# Patient Record
Sex: FEMALE | NOT HISPANIC OR LATINO | URBAN - METROPOLITAN AREA
[De-identification: names, ages, dates, MRNs, and addresses within clinical notes are randomized per-mention and may not be internally consistent; named-entity substitution may affect disease eponyms.]

---

## 2017-08-10 ENCOUNTER — INPATIENT (INPATIENT)
Facility: HOSPITAL | Age: 82
LOS: 7 days | Discharge: HOME | End: 2017-08-18
Attending: INTERNAL MEDICINE | Admitting: INTERNAL MEDICINE
Payer: MEDICAID

## 2017-08-10 VITALS
SYSTOLIC BLOOD PRESSURE: 140 MMHG | DIASTOLIC BLOOD PRESSURE: 73 MMHG | TEMPERATURE: 98 F | OXYGEN SATURATION: 95 % | HEART RATE: 64 BPM | RESPIRATION RATE: 15 BRPM

## 2017-08-10 DIAGNOSIS — R00.1 BRADYCARDIA, UNSPECIFIED: ICD-10-CM

## 2017-08-10 DIAGNOSIS — Z29.9 ENCOUNTER FOR PROPHYLACTIC MEASURES, UNSPECIFIED: ICD-10-CM

## 2017-08-10 DIAGNOSIS — I10 ESSENTIAL (PRIMARY) HYPERTENSION: ICD-10-CM

## 2017-08-10 DIAGNOSIS — E78.00 PURE HYPERCHOLESTEROLEMIA, UNSPECIFIED: ICD-10-CM

## 2017-08-10 DIAGNOSIS — Z98.890 OTHER SPECIFIED POSTPROCEDURAL STATES: Chronic | ICD-10-CM

## 2017-08-10 LAB
ALBUMIN SERPL ELPH-MCNC: 3.8 G/DL — SIGNIFICANT CHANGE UP (ref 3.3–5)
ALP SERPL-CCNC: 74 U/L — SIGNIFICANT CHANGE UP (ref 40–120)
ALT FLD-CCNC: 31 U/L — SIGNIFICANT CHANGE UP (ref 4–33)
APTT BLD: 27 SEC — LOW (ref 27.5–37.4)
AST SERPL-CCNC: 32 U/L — SIGNIFICANT CHANGE UP (ref 4–32)
BASOPHILS # BLD AUTO: 0.03 K/UL — SIGNIFICANT CHANGE UP (ref 0–0.2)
BASOPHILS NFR BLD AUTO: 0.6 % — SIGNIFICANT CHANGE UP (ref 0–2)
BILIRUB SERPL-MCNC: 0.5 MG/DL — SIGNIFICANT CHANGE UP (ref 0.2–1.2)
BUN SERPL-MCNC: 17 MG/DL — SIGNIFICANT CHANGE UP (ref 7–23)
CALCIUM SERPL-MCNC: 9.2 MG/DL — SIGNIFICANT CHANGE UP (ref 8.4–10.5)
CHLORIDE SERPL-SCNC: 106 MMOL/L — SIGNIFICANT CHANGE UP (ref 98–107)
CO2 SERPL-SCNC: 26 MMOL/L — SIGNIFICANT CHANGE UP (ref 22–31)
CREAT SERPL-MCNC: 1.02 MG/DL — SIGNIFICANT CHANGE UP (ref 0.5–1.3)
EOSINOPHIL # BLD AUTO: 0.52 K/UL — HIGH (ref 0–0.5)
EOSINOPHIL NFR BLD AUTO: 11.1 % — HIGH (ref 0–6)
GLUCOSE SERPL-MCNC: 87 MG/DL — SIGNIFICANT CHANGE UP (ref 70–99)
HCT VFR BLD CALC: 37.1 % — SIGNIFICANT CHANGE UP (ref 34.5–45)
HGB BLD-MCNC: 12 G/DL — SIGNIFICANT CHANGE UP (ref 11.5–15.5)
IMM GRANULOCYTES # BLD AUTO: 0.01 # — SIGNIFICANT CHANGE UP
IMM GRANULOCYTES NFR BLD AUTO: 0.2 % — SIGNIFICANT CHANGE UP (ref 0–1.5)
INR BLD: 1.03 — SIGNIFICANT CHANGE UP (ref 0.88–1.17)
LYMPHOCYTES # BLD AUTO: 1.55 K/UL — SIGNIFICANT CHANGE UP (ref 1–3.3)
LYMPHOCYTES # BLD AUTO: 33.2 % — SIGNIFICANT CHANGE UP (ref 13–44)
MCHC RBC-ENTMCNC: 27.1 PG — SIGNIFICANT CHANGE UP (ref 27–34)
MCHC RBC-ENTMCNC: 32.3 % — SIGNIFICANT CHANGE UP (ref 32–36)
MCV RBC AUTO: 83.9 FL — SIGNIFICANT CHANGE UP (ref 80–100)
MONOCYTES # BLD AUTO: 0.36 K/UL — SIGNIFICANT CHANGE UP (ref 0–0.9)
MONOCYTES NFR BLD AUTO: 7.7 % — SIGNIFICANT CHANGE UP (ref 2–14)
NEUTROPHILS # BLD AUTO: 2.2 K/UL — SIGNIFICANT CHANGE UP (ref 1.8–7.4)
NEUTROPHILS NFR BLD AUTO: 47.2 % — SIGNIFICANT CHANGE UP (ref 43–77)
NRBC # FLD: 0 — SIGNIFICANT CHANGE UP
PLATELET # BLD AUTO: 132 K/UL — LOW (ref 150–400)
PMV BLD: 11.7 FL — SIGNIFICANT CHANGE UP (ref 7–13)
POTASSIUM SERPL-MCNC: 4.4 MMOL/L — SIGNIFICANT CHANGE UP (ref 3.5–5.3)
POTASSIUM SERPL-SCNC: 4.4 MMOL/L — SIGNIFICANT CHANGE UP (ref 3.5–5.3)
PROT SERPL-MCNC: 7.2 G/DL — SIGNIFICANT CHANGE UP (ref 6–8.3)
PROTHROM AB SERPL-ACNC: 11.5 SEC — SIGNIFICANT CHANGE UP (ref 9.8–13.1)
RBC # BLD: 4.42 M/UL — SIGNIFICANT CHANGE UP (ref 3.8–5.2)
RBC # FLD: 12.9 % — SIGNIFICANT CHANGE UP (ref 10.3–14.5)
SODIUM SERPL-SCNC: 144 MMOL/L — SIGNIFICANT CHANGE UP (ref 135–145)
TROPONIN T SERPL-MCNC: < 0.06 NG/ML — SIGNIFICANT CHANGE UP (ref 0–0.06)
WBC # BLD: 4.67 K/UL — SIGNIFICANT CHANGE UP (ref 3.8–10.5)
WBC # FLD AUTO: 4.67 K/UL — SIGNIFICANT CHANGE UP (ref 3.8–10.5)

## 2017-08-10 PROCEDURE — 71010: CPT | Mod: 26

## 2017-08-10 RX ORDER — ATORVASTATIN CALCIUM 80 MG/1
80 TABLET, FILM COATED ORAL AT BEDTIME
Qty: 0 | Refills: 0 | Status: DISCONTINUED | OUTPATIENT
Start: 2017-08-10 | End: 2017-08-18

## 2017-08-10 RX ORDER — METOPROLOL TARTRATE 50 MG
50 TABLET ORAL DAILY
Qty: 0 | Refills: 0 | Status: DISCONTINUED | OUTPATIENT
Start: 2017-08-10 | End: 2017-08-18

## 2017-08-10 RX ORDER — AMLODIPINE BESYLATE 2.5 MG/1
5 TABLET ORAL DAILY
Qty: 0 | Refills: 0 | Status: DISCONTINUED | OUTPATIENT
Start: 2017-08-10 | End: 2017-08-18

## 2017-08-10 RX ORDER — LISINOPRIL 2.5 MG/1
40 TABLET ORAL DAILY
Qty: 0 | Refills: 0 | Status: DISCONTINUED | OUTPATIENT
Start: 2017-08-10 | End: 2017-08-18

## 2017-08-10 RX ORDER — RAMIPRIL 5 MG
2 CAPSULE ORAL
Qty: 0 | Refills: 0 | COMMUNITY

## 2017-08-10 RX ORDER — HEPARIN SODIUM 5000 [USP'U]/ML
5000 INJECTION INTRAVENOUS; SUBCUTANEOUS EVERY 8 HOURS
Qty: 0 | Refills: 0 | Status: DISCONTINUED | OUTPATIENT
Start: 2017-08-10 | End: 2017-08-13

## 2017-08-10 RX ORDER — SODIUM CHLORIDE 9 MG/ML
3 INJECTION INTRAMUSCULAR; INTRAVENOUS; SUBCUTANEOUS EVERY 8 HOURS
Qty: 0 | Refills: 0 | Status: DISCONTINUED | OUTPATIENT
Start: 2017-08-10 | End: 2017-08-18

## 2017-08-10 RX ORDER — METOPROLOL TARTRATE 50 MG
1 TABLET ORAL
Qty: 0 | Refills: 0 | COMMUNITY

## 2017-08-10 RX ORDER — ATORVASTATIN CALCIUM 80 MG/1
1 TABLET, FILM COATED ORAL
Qty: 0 | Refills: 0 | COMMUNITY

## 2017-08-10 RX ADMIN — SODIUM CHLORIDE 3 MILLILITER(S): 9 INJECTION INTRAMUSCULAR; INTRAVENOUS; SUBCUTANEOUS at 23:36

## 2017-08-10 NOTE — ED PROVIDER NOTE - CARE PLAN
Principal Discharge DX:	Bradycardia  Goal:	work up Principal Discharge DX:	Bradycardia  Goal:	work up.

## 2017-08-10 NOTE — ED PROVIDER NOTE - ATTENDING CONTRIBUTION TO CARE
patient sent in by cardiologist for bradycardia.  patient asymptomatic now. has holter monitor.  cv rrr.  admit to cards.

## 2017-08-10 NOTE — H&P ADULT - HISTORY OF PRESENT ILLNESS
84F, ambulates with a cane and has a history of WPW ablation at [place] 2015, NICM, EF 47% in April 2017, Last NST 5/2017 with No ischemia, who has been having Dyspnea, and was placed Holter monitor 8/9/17 for bradycardia. The monitor recorded 32 beats NSVT today and <1 min of Afib. EMS was called and the pt was taken to the Ed.     In the Ed, the EKG shows sinus sage with a 1st * AVB.  The pt was seen by the Cardio team. Their consult and recommendations are in the chart.   The pt currently denies chest pain, palpitations SOB, nausea, vomit, dizziness, falls.

## 2017-08-10 NOTE — CHART NOTE - NSCHARTNOTEFT_GEN_A_CORE
84 year old female with PMH WPW ablation at [place] 2015, NICM, EF 47% in April 2017, Last NST 5/2017 with No ischemia, who has been having Dyspnea, and was placed on an event monitor through my office.  THe monitor recorded 32 beats NSVT today and <1 min of Afib.  --Admit to tele  --Trend CE  --NPO after midnight for CArdiac Cath in AM  --If Afib persists >2 min, start heparin Gtt    Mary Beth Reynoso PA-C  [facility]  [address], [address]   [place] [address]  office [phone]  pager [phone]

## 2017-08-10 NOTE — ED PROVIDER NOTE - MEDICAL DECISION MAKING DETAILS
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

## 2017-08-10 NOTE — ED ADULT TRIAGE NOTE - CHIEF COMPLAINT QUOTE
Patient brought in by EMS from home. Patient on holter monitor and EMS was called by cardiologist who saw an abnormal rhythm (possibly bradycardia). Patient denies any symptoms. Placed holter monitor yesterday for bradycardia. PMH- htn, hld

## 2017-08-10 NOTE — ED ADULT NURSE NOTE - OBJECTIVE STATEMENT
Pt. received in stable condition and NAD AOx3. Sent for bradycardia from Holter monitor. Denies any symptoms at this time. 20G IV inserted to right AC. drawn and sent. MD evaluation in progress. Will cont. to monitor.

## 2017-08-10 NOTE — H&P ADULT - NSHPLABSRESULTS_GEN_ALL_CORE
CXR Slight left hemidiaphragm elevation. Focal indistinct bibasilar opacities indeterminate for subsegmental atelectatic change or scarring or possible developing airspace disease.  BUN/ Creatinine= 17/ 1.02  CE negative x 1  EKG SB @ 57b/ min, QT/ QTC = 402/ 449

## 2017-08-10 NOTE — PATIENT PROFILE ADULT. - ABILITY TO HEAR (WITH HEARING AID OR HEARING APPLIANCE IF NORMALLY USED):
Mildly to Moderately Impaired: difficulty hearing in some environments or speaker may need to increase volume or speak distinctly/left [place]

## 2017-08-10 NOTE — H&P ADULT - NEGATIVE ENMT SYMPTOMS
no abnormal taste sensation/no nose bleeds/no sinus symptoms/no gum bleeding/no ear pain/no tinnitus/no vertigo

## 2017-08-10 NOTE — H&P ADULT - NEGATIVE OPHTHALMOLOGIC SYMPTOMS
no photophobia/no discharge L/no blurred vision R/no lacrimation R/no lacrimation L/no blurred vision L/no discharge R

## 2017-08-10 NOTE — ED PROVIDER NOTE - OBJECTIVE STATEMENT
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 9/2015), NICM (EF 45%), had external cardiac monitor placed by outpt cardiology (George Millan) on 8/8, BIBEMS after device detected bradycardia to 37. At current time, pt denies CP, SOB, palpitations, abdomen pain, fevers, chills.     Traveled to [place] in April  Bradycardia to 37     PCP: Agustín?   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 At current time, pt denies CP, SOB, palpitations, abdomen pain, fevers, chills.     Traveled to [place] in April  Bradycardia to 37     PCP: Agustín?   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 At current time, pt denies CP, SOB, palpitations, abdomen pain, fevers, chills.     Traveled to [place] in April  Bradycardia to 37     PCP: Misbah  Cardio: Yana [place]  Daughter: Vicky [place]

## 2017-08-11 LAB
BUN SERPL-MCNC: 16 MG/DL — SIGNIFICANT CHANGE UP (ref 7–23)
CALCIUM SERPL-MCNC: 9.2 MG/DL — SIGNIFICANT CHANGE UP (ref 8.4–10.5)
CHLORIDE SERPL-SCNC: 106 MMOL/L — SIGNIFICANT CHANGE UP (ref 98–107)
CHOLEST SERPL-MCNC: 115 MG/DL — LOW (ref 120–199)
CK SERPL-CCNC: 101 U/L — SIGNIFICANT CHANGE UP (ref 25–170)
CO2 SERPL-SCNC: 27 MMOL/L — SIGNIFICANT CHANGE UP (ref 22–31)
CREAT SERPL-MCNC: 0.99 MG/DL — SIGNIFICANT CHANGE UP (ref 0.5–1.3)
GLUCOSE SERPL-MCNC: 93 MG/DL — SIGNIFICANT CHANGE UP (ref 70–99)
HCT VFR BLD CALC: 36.7 % — SIGNIFICANT CHANGE UP (ref 34.5–45)
HDLC SERPL-MCNC: 52 MG/DL — SIGNIFICANT CHANGE UP (ref 45–65)
HGB BLD-MCNC: 12 G/DL — SIGNIFICANT CHANGE UP (ref 11.5–15.5)
LIPID PNL WITH DIRECT LDL SERPL: 62 MG/DL — SIGNIFICANT CHANGE UP
MAGNESIUM SERPL-MCNC: 1.9 MG/DL — SIGNIFICANT CHANGE UP (ref 1.6–2.6)
MCHC RBC-ENTMCNC: 27.5 PG — SIGNIFICANT CHANGE UP (ref 27–34)
MCHC RBC-ENTMCNC: 32.7 % — SIGNIFICANT CHANGE UP (ref 32–36)
MCV RBC AUTO: 84.2 FL — SIGNIFICANT CHANGE UP (ref 80–100)
NRBC # FLD: 0 — SIGNIFICANT CHANGE UP
PHOSPHATE SERPL-MCNC: 3.7 MG/DL — SIGNIFICANT CHANGE UP (ref 2.5–4.5)
PLATELET # BLD AUTO: 129 K/UL — LOW (ref 150–400)
PMV BLD: 12.4 FL — SIGNIFICANT CHANGE UP (ref 7–13)
POTASSIUM SERPL-MCNC: 4.2 MMOL/L — SIGNIFICANT CHANGE UP (ref 3.5–5.3)
POTASSIUM SERPL-SCNC: 4.2 MMOL/L — SIGNIFICANT CHANGE UP (ref 3.5–5.3)
RBC # BLD: 4.36 M/UL — SIGNIFICANT CHANGE UP (ref 3.8–5.2)
RBC # FLD: 12.9 % — SIGNIFICANT CHANGE UP (ref 10.3–14.5)
SODIUM SERPL-SCNC: 143 MMOL/L — SIGNIFICANT CHANGE UP (ref 135–145)
T3 SERPL-MCNC: 112 NG/DL — SIGNIFICANT CHANGE UP (ref 80–200)
T4 AB SER-ACNC: 7.03 UG/DL — SIGNIFICANT CHANGE UP (ref 5.1–13)
TRIGL SERPL-MCNC: 40 MG/DL — SIGNIFICANT CHANGE UP (ref 10–149)
TROPONIN T SERPL-MCNC: < 0.06 NG/ML — SIGNIFICANT CHANGE UP (ref 0–0.06)
TSH SERPL-MCNC: 0.92 UIU/ML — SIGNIFICANT CHANGE UP (ref 0.27–4.2)
WBC # BLD: 4.16 K/UL — SIGNIFICANT CHANGE UP (ref 3.8–10.5)
WBC # FLD AUTO: 4.16 K/UL — SIGNIFICANT CHANGE UP (ref 3.8–10.5)

## 2017-08-11 PROCEDURE — 71270 CT THORAX DX C-/C+: CPT | Mod: 26

## 2017-08-11 PROCEDURE — 74178 CT ABD&PLV WO CNTR FLWD CNTR: CPT | Mod: 26

## 2017-08-11 PROCEDURE — 76775 US EXAM ABDO BACK WALL LIM: CPT | Mod: 26

## 2017-08-11 RX ORDER — MAGNESIUM OXIDE 400 MG ORAL TABLET 241.3 MG
400 TABLET ORAL
Qty: 0 | Refills: 0 | Status: COMPLETED | OUTPATIENT
Start: 2017-08-11 | End: 2017-08-13

## 2017-08-11 RX ADMIN — Medication 50 MILLIGRAM(S): at 05:37

## 2017-08-11 RX ADMIN — SODIUM CHLORIDE 3 MILLILITER(S): 9 INJECTION INTRAMUSCULAR; INTRAVENOUS; SUBCUTANEOUS at 05:36

## 2017-08-11 RX ADMIN — ATORVASTATIN CALCIUM 80 MILLIGRAM(S): 80 TABLET, FILM COATED ORAL at 22:07

## 2017-08-11 RX ADMIN — SODIUM CHLORIDE 3 MILLILITER(S): 9 INJECTION INTRAMUSCULAR; INTRAVENOUS; SUBCUTANEOUS at 22:06

## 2017-08-11 RX ADMIN — HEPARIN SODIUM 5000 UNIT(S): 5000 INJECTION INTRAVENOUS; SUBCUTANEOUS at 12:31

## 2017-08-11 RX ADMIN — HEPARIN SODIUM 5000 UNIT(S): 5000 INJECTION INTRAVENOUS; SUBCUTANEOUS at 05:37

## 2017-08-11 RX ADMIN — LISINOPRIL 40 MILLIGRAM(S): 2.5 TABLET ORAL at 05:37

## 2017-08-11 RX ADMIN — SODIUM CHLORIDE 3 MILLILITER(S): 9 INJECTION INTRAMUSCULAR; INTRAVENOUS; SUBCUTANEOUS at 12:05

## 2017-08-11 RX ADMIN — MAGNESIUM OXIDE 400 MG ORAL TABLET 400 MILLIGRAM(S): 241.3 TABLET ORAL at 17:35

## 2017-08-11 RX ADMIN — ATORVASTATIN CALCIUM 80 MILLIGRAM(S): 80 TABLET, FILM COATED ORAL at 00:04

## 2017-08-11 RX ADMIN — AMLODIPINE BESYLATE 5 MILLIGRAM(S): 2.5 TABLET ORAL at 05:37

## 2017-08-11 RX ADMIN — Medication 1 TABLET(S): at 12:31

## 2017-08-11 RX ADMIN — HEPARIN SODIUM 5000 UNIT(S): 5000 INJECTION INTRAVENOUS; SUBCUTANEOUS at 22:07

## 2017-08-11 NOTE — PROGRESS NOTE ADULT - SUBJECTIVE AND OBJECTIVE BOX
SUBJECTIVE:  No CP, SOB      MEDICATIONS  (STANDING):  sodium chloride 0.9% lock flush 3 milliLiter(s) IV Push every 8 hours  lisinopril 40 milliGRAM(s) Oral daily  atorvastatin 80 milliGRAM(s) Oral at bedtime  metoprolol succinate ER 50 milliGRAM(s) Oral daily  amLODIPine   Tablet 5 milliGRAM(s) Oral daily  multivitamin 1 Tablet(s) Oral daily  heparin  Injectable 5000 Unit(s) SubCutaneous every 8 hours    LABS:                        12.0   4.16  )-----------( 129      ( 11 Aug 2017 06:30 )             36.7     143  |  106  |  16  ----------------------------<  93  4.2   |  27  |  0.99    Ca    9.2      11 Aug 2017 06:30  Phos  3.7     08-11  Mg     1.9     08-11    TPro  7.2  /  Alb  3.8  /  TBili  0.5  /  DBili  x   /  AST  32  /  ALT  31  /  AlkPhos  74  08-10    CARDIAC MARKERS ( 11 Aug 2017 00:40 )  x     / < 0.06 ng/mL / 101 u/L / x     / x      CARDIAC MARKERS ( 10 Aug 2017 12:30 )  x     / < 0.06 ng/mL / x     / x     / x        PHYSICAL EXAM:  Vital Signs Last 24 Hrs  T(C): 36.6 (11 Aug 2017 05:35), Max: 36.8 (10 Aug 2017 18:54)  T(F): 97.9 (11 Aug 2017 05:35), Max: 98.3 (10 Aug 2017 18:54)  HR: 60 (11 Aug 2017 05:35) (47 - 64)  BP: 124/72 (11 Aug 2017 05:35) (109/56 - 140/73)  RR: 18 (11 Aug 2017 05:35) (15 - 19)  SpO2: 99% (11 Aug 2017 05:35) (95% - 100%)    HEENT: Normal Oral mucosa, PERRL, EOMI  Lymphatic: No obvious lymphadenopathy, No edema  Cardiovascular: Normal S1S2, No JVD, 1/6 MARK, Peripheral pulses palpable 2+ B/L  Respiratory: Lungs clear to auscultation, normal effort  Gastrointestinal: Abdomen soft, ND, NT, +BS  Skin: Warm, dry, intact. No cyanosis, No rash.  Musculoskeletal: Normal ROM, normal strength  Psychiatric: Appropriate Mood and Affect      DIAGNOSTIC DATA  TELEMETRY: SB/SR      ASSESSMENT AND PLAN:  84 year old female with PMH WPW ablation at [place] 2015, NIC, EF 47% in April 2017, Last NST 5/2017 with No ischemia, who has been having Dyspnea, and was placed on an event monitor through my office.  THe monitor recorded 32 beats NSVT today and <1 min of Afib.  --D/W Dr [place].  patient had an abnormal Abdominal sono in our office.   --Check CTA CAP today and Abd sono here to eval for AAA, R/O dissection  --PLan for cardiac cath pending those studies  --Continue telemetry  --Repeat TTE with definity  --If Afib persists >2 min, start heparin Gtt    Mary Beth Reynoso PA-C  [facility]  [address], [address]   [place] [address]  office [phone]  pager [phone].

## 2017-08-12 DIAGNOSIS — R93.5 ABNORMAL FINDINGS ON DIAGNOSTIC IMAGING OF OTHER ABDOMINAL REGIONS, INCLUDING RETROPERITONEUM: ICD-10-CM

## 2017-08-12 LAB
BUN SERPL-MCNC: 20 MG/DL — SIGNIFICANT CHANGE UP (ref 7–23)
CALCIUM SERPL-MCNC: 9.5 MG/DL — SIGNIFICANT CHANGE UP (ref 8.4–10.5)
CHLORIDE SERPL-SCNC: 103 MMOL/L — SIGNIFICANT CHANGE UP (ref 98–107)
CO2 SERPL-SCNC: 26 MMOL/L — SIGNIFICANT CHANGE UP (ref 22–31)
CREAT SERPL-MCNC: 1.07 MG/DL — SIGNIFICANT CHANGE UP (ref 0.5–1.3)
GLUCOSE SERPL-MCNC: 95 MG/DL — SIGNIFICANT CHANGE UP (ref 70–99)
HCT VFR BLD CALC: 37.2 % — SIGNIFICANT CHANGE UP (ref 34.5–45)
HGB BLD-MCNC: 12.1 G/DL — SIGNIFICANT CHANGE UP (ref 11.5–15.5)
MAGNESIUM SERPL-MCNC: 1.9 MG/DL — SIGNIFICANT CHANGE UP (ref 1.6–2.6)
MCHC RBC-ENTMCNC: 26.8 PG — LOW (ref 27–34)
MCHC RBC-ENTMCNC: 32.5 % — SIGNIFICANT CHANGE UP (ref 32–36)
MCV RBC AUTO: 82.3 FL — SIGNIFICANT CHANGE UP (ref 80–100)
NRBC # FLD: 0 — SIGNIFICANT CHANGE UP
PHOSPHATE SERPL-MCNC: 3.7 MG/DL — SIGNIFICANT CHANGE UP (ref 2.5–4.5)
PLATELET # BLD AUTO: 133 K/UL — LOW (ref 150–400)
PMV BLD: 11.6 FL — SIGNIFICANT CHANGE UP (ref 7–13)
POTASSIUM SERPL-MCNC: 4.3 MMOL/L — SIGNIFICANT CHANGE UP (ref 3.5–5.3)
POTASSIUM SERPL-SCNC: 4.3 MMOL/L — SIGNIFICANT CHANGE UP (ref 3.5–5.3)
RBC # BLD: 4.52 M/UL — SIGNIFICANT CHANGE UP (ref 3.8–5.2)
RBC # FLD: 12.8 % — SIGNIFICANT CHANGE UP (ref 10.3–14.5)
SODIUM SERPL-SCNC: 140 MMOL/L — SIGNIFICANT CHANGE UP (ref 135–145)
WBC # BLD: 4.54 K/UL — SIGNIFICANT CHANGE UP (ref 3.8–10.5)
WBC # FLD AUTO: 4.54 K/UL — SIGNIFICANT CHANGE UP (ref 3.8–10.5)

## 2017-08-12 RX ADMIN — ATORVASTATIN CALCIUM 80 MILLIGRAM(S): 80 TABLET, FILM COATED ORAL at 22:22

## 2017-08-12 RX ADMIN — SODIUM CHLORIDE 3 MILLILITER(S): 9 INJECTION INTRAMUSCULAR; INTRAVENOUS; SUBCUTANEOUS at 05:34

## 2017-08-12 RX ADMIN — MAGNESIUM OXIDE 400 MG ORAL TABLET 400 MILLIGRAM(S): 241.3 TABLET ORAL at 17:34

## 2017-08-12 RX ADMIN — Medication 1 TABLET(S): at 11:54

## 2017-08-12 RX ADMIN — HEPARIN SODIUM 5000 UNIT(S): 5000 INJECTION INTRAVENOUS; SUBCUTANEOUS at 14:20

## 2017-08-12 RX ADMIN — SODIUM CHLORIDE 3 MILLILITER(S): 9 INJECTION INTRAMUSCULAR; INTRAVENOUS; SUBCUTANEOUS at 22:22

## 2017-08-12 RX ADMIN — MAGNESIUM OXIDE 400 MG ORAL TABLET 400 MILLIGRAM(S): 241.3 TABLET ORAL at 11:54

## 2017-08-12 RX ADMIN — HEPARIN SODIUM 5000 UNIT(S): 5000 INJECTION INTRAVENOUS; SUBCUTANEOUS at 22:22

## 2017-08-12 RX ADMIN — LISINOPRIL 40 MILLIGRAM(S): 2.5 TABLET ORAL at 05:35

## 2017-08-12 RX ADMIN — HEPARIN SODIUM 5000 UNIT(S): 5000 INJECTION INTRAVENOUS; SUBCUTANEOUS at 05:35

## 2017-08-12 RX ADMIN — SODIUM CHLORIDE 3 MILLILITER(S): 9 INJECTION INTRAMUSCULAR; INTRAVENOUS; SUBCUTANEOUS at 11:50

## 2017-08-12 RX ADMIN — AMLODIPINE BESYLATE 5 MILLIGRAM(S): 2.5 TABLET ORAL at 05:35

## 2017-08-12 NOTE — PROGRESS NOTE ADULT - SUBJECTIVE AND OBJECTIVE BOX
SUBJECTIVE:  No CP, SOB      MEDICATIONS  (STANDING):  sodium chloride 0.9% lock flush 3 milliLiter(s) IV Push every 8 hours  lisinopril 40 milliGRAM(s) Oral daily  atorvastatin 80 milliGRAM(s) Oral at bedtime  metoprolol succinate ER 50 milliGRAM(s) Oral daily  amLODIPine   Tablet 5 milliGRAM(s) Oral daily  multivitamin 1 Tablet(s) Oral daily  heparin  Injectable 5000 Unit(s) SubCutaneous every 8 hours  magnesium oxide 400 milliGRAM(s) Oral two times a day with meals    MEDICATIONS  (PRN):      LABS:                        12.1   4.54  )-----------( 133      ( 12 Aug 2017 06:30 )             37.2     Hemoglobin: 12.1 g/dL (08-12 @ 06:30)  Hemoglobin: 12.0 g/dL (08-11 @ 06:30)  Hemoglobin: 12.0 g/dL (08-10 @ 12:30)    08-12    140  |  103  |  20  ----------------------------<  95  4.3   |  26  |  1.07    Ca    9.5      12 Aug 2017 06:30  Phos  3.7     08-12  Mg     1.9     08-12    TPro  7.2  /  Alb  3.8  /  TBili  0.5  /  DBili  x   /  AST  32  /  ALT  31  /  AlkPhos  74  08-10    Creatinine Trend: 1.07<--, 0.99<--, 1.02<--     CARDIAC MARKERS ( 11 Aug 2017 00:40 )  x     / < 0.06 ng/mL / 101 u/L / x     / x      CARDIAC MARKERS ( 10 Aug 2017 12:30 )  x     / < 0.06 ng/mL / x     / x     / x          PHYSICAL EXAM  Vital Signs Last 24 Hrs  T(C): 36.6 (12 Aug 2017 05:26), Max: 36.6 (12 Aug 2017 05:26)  T(F): 97.9 (12 Aug 2017 05:26), Max: 97.9 (12 Aug 2017 05:26)  HR: 56 (12 Aug 2017 05:26) (56 - 62)  BP: 103/63 (12 Aug 2017 05:26) (103/63 - 144/71)  RR: 17 (11 Aug 2017 22:04) (16 - 17)  SpO2: 100% (12 Aug 2017 05:26) (100% - 100%)    HEENT: Normal Oral mucosa, PERRL, EOMI  Lymphatic: No obvious lymphadenopathy, No edema  Cardiovascular: Normal S1S2, No JVD, 1/6 MARK, Peripheral pulses palpable 2+ B/L  Respiratory: Lungs clear to auscultation, normal effort  Gastrointestinal: Abdomen soft, ND, NT, +BS  Skin: Warm, dry, intact. No cyanosis, No rash.  Musculoskeletal: Normal ROM, normal strength  Psychiatric: Appropriate Mood and Affect      DIAGNOSTIC DATA  TELEMETRY: SB/SR    < from: US Abdominal Aorta (08.11.17 @ 11:00) >  IMPRESSION:     No evidence of abdominal aortic aneurysm. Correlated with pending CT.      ADILIA ALATORRE M.D., RADIOLOGYRESIDENT  This document has been electronically signed.  CHINTAN JARVIS M.D., ATTENDING RADIOLOGIST  This document has been electronically signed. Aug 11 2017 12:27PM    < end of copied text >    < from: CT Angio Abdomen and Pelvis w/ IV Cont (08.11.17 @ 15:34) >  IMPRESSION:   No evidence of aortic dissection.  No evidence of AAA. Tortuous descending abdominal aorta.  Dilatation of the pancreatic and common bile duct. Further evaluation   with endoscopy and/or MRCP is suggested.    BESS BERTRAND M.D., ATTENDING RADIOLOGIST  This document has been electronically signed. Aug 11 2017  4:40PM    < end of copied text >      ASSESSMENT AND PLAN:  84 year old female with PMH WPW ablation at [place] 2015, NICM, EF 47% in April 2017, Last NST 5/2017 with No ischemia, who has been having Dyspnea, and was placed on an event monitor through my office.  THe monitor recorded 32 beats NSVT today and <1 min of Afib.  --Per Dr Vaz[place], patient had an abnormal Abdominal sono in our office so a repeat was done (as above) and a CTA was done  --GI eval called regarding CTA findings  --Repeat TTE with definity pending  --No further evidence of AF on tele since admit  --if AFib persisted >2 min then start Heparin gtt  --PLan for cardiac cath next week  --check daily labs and keep NPO after MN on 8/13  --Continue telemetry      Mary Beth Reynoso PA-C  [facility]  [address], [address]   [place] [address]  office [phone]  pager [phone].

## 2017-08-12 NOTE — CONSULT NOTE ADULT - SUBJECTIVE AND OBJECTIVE BOX
Chief Complaint:  Patient is a 84y old  Female who presents with a chief complaint of Bradycardia   84F, ambulates with a cane and has a history of WPW ablation at [place] 2015, [facility], EF 47% in 2017, Last NST 2017 with No ischemia, who has been having Dyspnea, and was placed Holter monitor 17 for bradycardia. The monitor recorded 32 beats NSVT today and <1 min of Afib. EMS was called and the pt was taken to the Ed.   no weight loss apetite is good never had imaging before no jaundice or icterus  In the Ed, the EKG shows sinus sage with a 1st * AVB.  The pt was seen by the Cardio team. Their consult and recommendations are in the chart.   The pt currently denies chest pain, palpitations SOB, nausea, vomit, dizziness, falls.         HPI:    Allergies:  No Known Allergies      Medications:  sodium chloride 0.9% lock flush 3 milliLiter(s) IV Push every 8 hours  lisinopril 40 milliGRAM(s) Oral daily  atorvastatin 80 milliGRAM(s) Oral at bedtime  metoprolol succinate ER 50 milliGRAM(s) Oral daily  amLODIPine   Tablet 5 milliGRAM(s) Oral daily  multivitamin 1 Tablet(s) Oral daily  heparin  Injectable 5000 Unit(s) SubCutaneous every 8 hours  magnesium oxide 400 milliGRAM(s) Oral two times a day with meals      PMHX/PSHX:  Hypercholesteremia  Essential hypertension  H/O prior ablation treatment  No significant past surgical history      Family history:  No pertinent family history in first degree relatives      Social History:  and lives with spouse   Denies Nicotine   Denies ETOH    ROS:     General:  No wt loss, fevers, chills, night sweats, fatigue,   Eyes:  Good vision, no reported pain  ENT:  No sore throat, pain, runny nose, dysphagia  CV:  No pain, palpitations, hypo/hypertension  Resp:  No dyspnea, cough, tachypnea, wheezing  GI:  No pain, No nausea, No vomiting, No diarrhea, No constipation, No weight loss, No fever, No pruritis, No rectal bleeding, No tarry stools, No dysphagia,  :  No pain, bleeding, incontinence, nocturia  Muscle:  No pain, weakness  Neuro:  No weakness, tingling, memory problems  Psych:  No fatigue, insomnia, mood problems, depression  Endocrine:  No polyuria, polydipsia, cold/heat intolerance  Heme:  No petechiae, ecchymosis, easy bruisability  Skin:  No rash, tattoos, scars, edema      PHYSICAL EXAM:   Vital Signs:  Vital Signs Last 24 Hrs  T(C): 36.1 (12 Aug 2017 13:35), Max: 36.6 (12 Aug 2017 05:26)  T(F): 97 (12 Aug 2017 13:35), Max: 97.9 (12 Aug 2017 05:26)  HR: 70 (12 Aug 2017 15:04) (56 - 72)  BP: 125/60 (12 Aug 2017 15:04) (103/63 - 144/71)  BP(mean): --  RR: 18 (12 Aug 2017 13:35) (17 - 18)  SpO2: 100% (12 Aug 2017 13:) (100% - 100%)  Daily     Daily Weight in k.4 (12 Aug 2017 05:26)    GENERAL:  Appears stated age, well-groomed, well-nourished, no distress  HEENT:  NC/AT,  conjunctivae clear and pink, no thyromegaly, nodules, adenopathy, no JVD, sclera -anicteric  CHEST:  Full & symmetric excursion, no increased effort, breath sounds clear  HEART:  Regular rhythm, S1, S2, no murmur/rub/S3/S4, no abdominal bruit, no edema  ABDOMEN:  Soft, non-tender, non-distended, normoactive bowel sounds,  no masses ,no hepato-splenomegaly, no signs of chronic liver disease  EXTEREMITIES:  no cyanosis,clubbing or edema  SKIN:  No rash/erythema/ecchymoses/petechiae/wounds/abscess/warm/dry  NEURO:  Alert, oriented, no asterixis, no tremor, no encephalopathy    LABS:                        12.1   4.54  )-----------( 133      ( 12 Aug 2017 06:30 )             37.2     08-12    140  |  103  |  20  ----------------------------<  95  4.3   |  26  |  1.07    Ca    9.5      12 Aug 2017 06:30  Phos  3.7     08-12  Mg     1.9     08-12                Imaging:

## 2017-08-12 NOTE — CONSULT NOTE ADULT - ASSESSMENT
84F with mild TCP and slight dilatation of the pancreatic duct without any mass, no splenomegaly, going for cath on monday. Will recommend:  - obtain B12, folate, RF, FRIEDA  - hepatitis profile, HIV 1/2  - currently, the plt count is acceptable for cath  - obtain GI evaluation for MRCP and or EUS  - obtain CEA, CA 19-9  - will f/u  - message sent for DR. Escalera and spoke to PA  - rest of the Rx as per medicine and cardiology

## 2017-08-12 NOTE — CONSULT NOTE ADULT - SUBJECTIVE AND OBJECTIVE BOX
Patient is a 84y old  Female who presents with a chief complaint of Bradycardia (10 Aug 2017 23:30), sent here from cardiology office, feels better but is lightheaded, denies any pain, blurry or double vision and rest of the detailed ROS unremarkable. Does not remember if she ever had a mammogram, colonoscopy etc. No h/o any blood transfusion or jaundice.      PAST MEDICAL & SURGICAL HISTORY:  Hypercholesteremia  Essential hypertension  H/O prior ablation treatment    Meds:  sodium chloride 0.9% lock flush 3 milliLiter(s) IV Push every 8 hours  lisinopril 40 milliGRAM(s) Oral daily  atorvastatin 80 milliGRAM(s) Oral at bedtime  metoprolol succinate ER 50 milliGRAM(s) Oral daily  amLODIPine   Tablet 5 milliGRAM(s) Oral daily  multivitamin 1 Tablet(s) Oral daily  heparin  Injectable 5000 Unit(s) SubCutaneous every 8 hours  magnesium oxide 400 milliGRAM(s) Oral two times a day with meals      No Known Allergies    SHx: never smoked, denies alcohol use    FAMILY HISTORY:  No pertinent family history in first degree relatives      Vital Signs Last 24 Hrs  T(C): 36.6 (12 Aug 2017 05:26), Max: 36.6 (12 Aug 2017 05:26)  T(F): 97.9 (12 Aug 2017 05:26), Max: 97.9 (12 Aug 2017 05:26)  HR: 56 (12 Aug 2017 05:26) (56 - 62)  BP: 103/63 (12 Aug 2017 05:26) (103/63 - 144/71)  BP(mean): --  RR: 17 (11 Aug 2017 22:04) (16 - 17)  SpO2: 100% (12 Aug 2017 05:26) (100% - 100%)                          12.1   4.54  )-----------( 133      ( 12 Aug 2017 06:30 )             37.2       08-12    140  |  103  |  20  ----------------------------<  95  4.3   |  26  |  1.07    Ca    9.5      12 Aug 2017 06:30  Phos  3.7     08-12  Mg     1.9     08-12    TPro  7.2  /  Alb  3.8  /  TBili  0.5  /  DBili  x   /  AST  32  /  ALT  31  /  AlkPhos  74  08-10      PT/INR - ( 10 Aug 2017 12:30 )   PT: 11.5 SEC;   INR: 1.03          PTT - ( 10 Aug 2017 12:30 )  PTT:27.0 SEC    CT angio chest/a/p: CHEST:     LUNGS AND LARGE AIRWAYS: Patent central airways. Bibasilar subsegmental   atelectasis. No pulmonary nodules.  PLEURA: No pleural effusion.  VESSELS: No evidence of thoracic aortic dissection. Atherosclerotic   calcifications of aorta and coronary arteries.  HEART: Heart size is normal. No pericardial effusion.  MEDIASTINUM AND JODIE: No lymphadenopathy.  CHEST WALL AND LOWER NECK: Within normal limits.    ABDOMEN AND PELVIS:    LIVER: Within normal limits.  BILE DUCTS: Normal caliber.  GALLBLADDER: Small amount of sludge or tiny noncalcified dependent stone.  SPLEEN: Within normal limits.  PANCREAS: Dilatation of pancreatic and common bile duct to about 8 mm. No   definite mass seen. ADRENALS: Within normal limits.  KIDNEYS/URETERS: 5.1 x 5.7 cm left renal cysts.  BLADDER: Within normal limits.  REPRODUCTIVE ORGANS: Within normal limits.    BOWEL: No bowel obstruction. Appendix      PERITONEUM: No ascites.  VESSELS:  Tortuous abdominal aorta. No evidence of AAA.  RETROPERITONEUM: No lymphadenopathy.    ABDOMINAL WALL: Within normal limits.  BONES: Degenerative disease of spine..    IMPRESSION:   No evidence of aortic dissection.  No evidence of AAA. Tortuous descending abdominal aorta.  Dilatation of the pancreatic and common bile duct. Further evaluation   with endoscopy and/or MRCP is suggested.

## 2017-08-13 LAB
APTT BLD: > 200 SEC — CRITICAL HIGH (ref 27.5–37.4)
BUN SERPL-MCNC: 19 MG/DL — SIGNIFICANT CHANGE UP (ref 7–23)
CALCIUM SERPL-MCNC: 9.5 MG/DL — SIGNIFICANT CHANGE UP (ref 8.4–10.5)
CANCER AG19-9 SERPL-ACNC: < 1 U/ML — SIGNIFICANT CHANGE UP
CEA SERPL-MCNC: 3.5 NG/ML — SIGNIFICANT CHANGE UP (ref 0.2–3.8)
CHLORIDE SERPL-SCNC: 103 MMOL/L — SIGNIFICANT CHANGE UP (ref 98–107)
CO2 SERPL-SCNC: 28 MMOL/L — SIGNIFICANT CHANGE UP (ref 22–31)
CREAT SERPL-MCNC: 1.08 MG/DL — SIGNIFICANT CHANGE UP (ref 0.5–1.3)
FOLATE SERPL-MCNC: 12 NG/ML — SIGNIFICANT CHANGE UP (ref 4.7–20)
GLUCOSE SERPL-MCNC: 96 MG/DL — SIGNIFICANT CHANGE UP (ref 70–99)
HAV IGM SER-ACNC: NONREACTIVE — SIGNIFICANT CHANGE UP
HBV CORE IGM SER-ACNC: NONREACTIVE — SIGNIFICANT CHANGE UP
HBV SURFACE AG SER-ACNC: NONREACTIVE — SIGNIFICANT CHANGE UP
HCT VFR BLD CALC: 37.9 % — SIGNIFICANT CHANGE UP (ref 34.5–45)
HCT VFR BLD CALC: 39.3 % — SIGNIFICANT CHANGE UP (ref 34.5–45)
HCV AB S/CO SERPL IA: 0.15 S/CO — SIGNIFICANT CHANGE UP
HCV AB SERPL-IMP: SIGNIFICANT CHANGE UP
HGB BLD-MCNC: 12.3 G/DL — SIGNIFICANT CHANGE UP (ref 11.5–15.5)
HGB BLD-MCNC: 12.8 G/DL — SIGNIFICANT CHANGE UP (ref 11.5–15.5)
HIV1 AG SER QL: SIGNIFICANT CHANGE UP
HIV1+2 AB SPEC QL: SIGNIFICANT CHANGE UP
MAGNESIUM SERPL-MCNC: 2.1 MG/DL — SIGNIFICANT CHANGE UP (ref 1.6–2.6)
MCHC RBC-ENTMCNC: 27.1 PG — SIGNIFICANT CHANGE UP (ref 27–34)
MCHC RBC-ENTMCNC: 27.1 PG — SIGNIFICANT CHANGE UP (ref 27–34)
MCHC RBC-ENTMCNC: 32.5 % — SIGNIFICANT CHANGE UP (ref 32–36)
MCHC RBC-ENTMCNC: 32.6 % — SIGNIFICANT CHANGE UP (ref 32–36)
MCV RBC AUTO: 83.3 FL — SIGNIFICANT CHANGE UP (ref 80–100)
MCV RBC AUTO: 83.5 FL — SIGNIFICANT CHANGE UP (ref 80–100)
NRBC # FLD: 0 — SIGNIFICANT CHANGE UP
NRBC # FLD: 0 — SIGNIFICANT CHANGE UP
PHOSPHATE SERPL-MCNC: 3.9 MG/DL — SIGNIFICANT CHANGE UP (ref 2.5–4.5)
PLATELET # BLD AUTO: 133 K/UL — LOW (ref 150–400)
PLATELET # BLD AUTO: 139 K/UL — LOW (ref 150–400)
PMV BLD: 11.7 FL — SIGNIFICANT CHANGE UP (ref 7–13)
PMV BLD: 12.5 FL — SIGNIFICANT CHANGE UP (ref 7–13)
POTASSIUM SERPL-MCNC: 4.6 MMOL/L — SIGNIFICANT CHANGE UP (ref 3.5–5.3)
POTASSIUM SERPL-SCNC: 4.6 MMOL/L — SIGNIFICANT CHANGE UP (ref 3.5–5.3)
RBC # BLD: 4.54 M/UL — SIGNIFICANT CHANGE UP (ref 3.8–5.2)
RBC # BLD: 4.72 M/UL — SIGNIFICANT CHANGE UP (ref 3.8–5.2)
RBC # FLD: 12.9 % — SIGNIFICANT CHANGE UP (ref 10.3–14.5)
RBC # FLD: 12.9 % — SIGNIFICANT CHANGE UP (ref 10.3–14.5)
RHEUMATOID FACT SERPL-ACNC: 9.9 IU/ML — SIGNIFICANT CHANGE UP
SODIUM SERPL-SCNC: 142 MMOL/L — SIGNIFICANT CHANGE UP (ref 135–145)
VIT B12 SERPL-MCNC: 450 PG/ML — SIGNIFICANT CHANGE UP (ref 200–900)
WBC # BLD: 4.82 K/UL — SIGNIFICANT CHANGE UP (ref 3.8–10.5)
WBC # BLD: 5.48 K/UL — SIGNIFICANT CHANGE UP (ref 3.8–10.5)
WBC # FLD AUTO: 4.82 K/UL — SIGNIFICANT CHANGE UP (ref 3.8–10.5)
WBC # FLD AUTO: 5.48 K/UL — SIGNIFICANT CHANGE UP (ref 3.8–10.5)

## 2017-08-13 RX ORDER — HEPARIN SODIUM 5000 [USP'U]/ML
INJECTION INTRAVENOUS; SUBCUTANEOUS
Qty: 25000 | Refills: 0 | Status: DISCONTINUED | OUTPATIENT
Start: 2017-08-13 | End: 2017-08-16

## 2017-08-13 RX ORDER — HEPARIN SODIUM 5000 [USP'U]/ML
6500 INJECTION INTRAVENOUS; SUBCUTANEOUS EVERY 6 HOURS
Qty: 0 | Refills: 0 | Status: DISCONTINUED | OUTPATIENT
Start: 2017-08-13 | End: 2017-08-16

## 2017-08-13 RX ORDER — HEPARIN SODIUM 5000 [USP'U]/ML
3000 INJECTION INTRAVENOUS; SUBCUTANEOUS EVERY 6 HOURS
Qty: 0 | Refills: 0 | Status: DISCONTINUED | OUTPATIENT
Start: 2017-08-13 | End: 2017-08-16

## 2017-08-13 RX ORDER — HEPARIN SODIUM 5000 [USP'U]/ML
6500 INJECTION INTRAVENOUS; SUBCUTANEOUS ONCE
Qty: 0 | Refills: 0 | Status: COMPLETED | OUTPATIENT
Start: 2017-08-13 | End: 2017-08-13

## 2017-08-13 RX ADMIN — HEPARIN SODIUM 1400 UNIT(S)/HR: 5000 INJECTION INTRAVENOUS; SUBCUTANEOUS at 12:52

## 2017-08-13 RX ADMIN — ATORVASTATIN CALCIUM 80 MILLIGRAM(S): 80 TABLET, FILM COATED ORAL at 21:10

## 2017-08-13 RX ADMIN — SODIUM CHLORIDE 3 MILLILITER(S): 9 INJECTION INTRAMUSCULAR; INTRAVENOUS; SUBCUTANEOUS at 05:47

## 2017-08-13 RX ADMIN — SODIUM CHLORIDE 3 MILLILITER(S): 9 INJECTION INTRAMUSCULAR; INTRAVENOUS; SUBCUTANEOUS at 12:49

## 2017-08-13 RX ADMIN — LISINOPRIL 40 MILLIGRAM(S): 2.5 TABLET ORAL at 05:45

## 2017-08-13 RX ADMIN — HEPARIN SODIUM 6500 UNIT(S): 5000 INJECTION INTRAVENOUS; SUBCUTANEOUS at 12:52

## 2017-08-13 RX ADMIN — Medication 1 TABLET(S): at 12:46

## 2017-08-13 RX ADMIN — SODIUM CHLORIDE 3 MILLILITER(S): 9 INJECTION INTRAMUSCULAR; INTRAVENOUS; SUBCUTANEOUS at 21:09

## 2017-08-13 RX ADMIN — AMLODIPINE BESYLATE 5 MILLIGRAM(S): 2.5 TABLET ORAL at 05:45

## 2017-08-13 RX ADMIN — HEPARIN SODIUM 0 UNIT(S)/HR: 5000 INJECTION INTRAVENOUS; SUBCUTANEOUS at 20:53

## 2017-08-13 RX ADMIN — HEPARIN SODIUM 1100 UNIT(S)/HR: 5000 INJECTION INTRAVENOUS; SUBCUTANEOUS at 22:08

## 2017-08-13 RX ADMIN — Medication 50 MILLIGRAM(S): at 05:45

## 2017-08-13 RX ADMIN — MAGNESIUM OXIDE 400 MG ORAL TABLET 400 MILLIGRAM(S): 241.3 TABLET ORAL at 09:04

## 2017-08-13 NOTE — PROGRESS NOTE ADULT - SUBJECTIVE AND OBJECTIVE BOX
no palpitations, no syncope, no angina    sodium chloride 0.9% lock flush 3 milliLiter(s) IV Push every 8 hours  lisinopril 40 milliGRAM(s) Oral daily  atorvastatin 80 milliGRAM(s) Oral at bedtime  metoprolol succinate ER 50 milliGRAM(s) Oral daily  amLODIPine   Tablet 5 milliGRAM(s) Oral daily  multivitamin 1 Tablet(s) Oral daily  heparin  Injectable 5000 Unit(s) SubCutaneous every 8 hours                            12.8   4.82  )-----------( 133      ( 13 Aug 2017 07:30 )             39.3       08-13    142  |  103  |  19  ----------------------------<  96  4.6   |  28  |  1.08    Ca    9.5      13 Aug 2017 07:30  Phos  3.9     08-13  Mg     2.1     08-13        CARDIAC MARKERS ( 11 Aug 2017 00:40 )  x     / < 0.06 ng/mL / 101 u/L / x     / x      CARDIAC MARKERS ( 10 Aug 2017 12:30 )  x     / < 0.06 ng/mL / x     / x     / x            T(C): 36.7 (08-13-17 @ 05:44), Max: 36.7 (08-13-17 @ 05:44)  HR: 62 (08-13-17 @ 05:44) (60 - 72)  BP: 103/61 (08-13-17 @ 05:44) (97/49 - 125/60)  RR: 18 (08-13-17 @ 05:44) (18 - 18)  SpO2: 99% (08-13-17 @ 05:44) (99% - 100%)  Wt(kg): --    no JVD  RRR, no murmurs  CTAB  soft nt/nd  no c/c/e      A/P 84 year old female with PMH WPW ablation at [place] (2015), NICM EF 47% in April 2017, Last NST 5/2017 with no ischemia, who has been having Dyspnea, and was placed on an event monitor through my office.  The monitor recorded 32 beats NSVT as well as PAF.     -f/u GI re CT findings  -f/u echo with definity to assure LVEF > 35%  -cardiac cath this week for NSVT  -eventual lifelong A/C for PAF  -start heparin drip

## 2017-08-13 NOTE — PROGRESS NOTE ADULT - SUBJECTIVE AND OBJECTIVE BOX
INTERVAL HPI/OVERNIGHT EVENTS:  HPI:  84F, ambulates with a cane and has a history of WPW ablation at [place] 2015, NIC, EF 47% in 2017, Last NST 2017 with No ischemia, who has been having Dyspnea, and was placed Holter monitor 17 for bradycardia. The monitor recorded 32 beats NSVT today and <1 min of Afib. EMS was called and the pt was taken to the Ed.   No new overnight event.  No N/V/D.  Tolerating diet.      MEDICATIONS  (STANDING):  sodium chloride 0.9% lock flush 3 milliLiter(s) IV Push every 8 hours  lisinopril 40 milliGRAM(s) Oral daily  atorvastatin 80 milliGRAM(s) Oral at bedtime  metoprolol succinate ER 50 milliGRAM(s) Oral daily  amLODIPine   Tablet 5 milliGRAM(s) Oral daily  multivitamin 1 Tablet(s) Oral daily  heparin  Infusion.  Unit(s)/Hr (14 mL/Hr) IV Continuous <Continuous>    MEDICATIONS  (PRN):  heparin  Injectable 6500 Unit(s) IV Push every 6 hours PRN For aPTT less than 40  heparin  Injectable 3000 Unit(s) IV Push every 6 hours PRN For aPTT between 40 - 57      Allergies    No Known Allergies    Intolerances          General:  No wt loss, fevers, chills, night sweats, fatigue,   Eyes:  Good vision, no reported pain  ENT:  No sore throat, pain, runny nose, dysphagia  CV:  No pain, palpitations, hypo/hypertension  Resp:  No dyspnea, cough, tachypnea, wheezing  GI:  No pain, No nausea, No vomiting, No diarrhea, No constipation, No weight loss, No fever, No pruritis, No rectal bleeding, No tarry stools, No dysphagia,  :  No pain, bleeding, incontinence, nocturia  Muscle:  No pain, weakness  Neuro:  No weakness, tingling, memory problems  Psych:  No fatigue, insomnia, mood problems, depression  Endocrine:  No polyuria, polydipsia, cold/heat intolerance  Heme:  No petechiae, ecchymosis, easy bruisability  Skin:  No rash, tattoos, scars, edema      PHYSICAL EXAM:   Vital Signs:  Vital Signs Last 24 Hrs  T(C): 36.6 (13 Aug 2017 13:00), Max: 36.7 (13 Aug 2017 05:44)  T(F): 97.9 (13 Aug 2017 13:00), Max: 98.1 (13 Aug 2017 05:44)  HR: 60 (13 Aug 2017 13:00) (60 - 62)  BP: 129/61 (13 Aug 2017 13:00) (97/49 - 129/61)  BP(mean): --  RR: 16 (13 Aug 2017 13:00) (16 - 18)  SpO2: 100% (13 Aug 2017 13:00) (99% - 100%)  Daily     Daily Weight in k.7 (13 Aug 2017 05:44)I&O's Summary      GENERAL:  Appears stated age, well-groomed, well-nourished, no distress  HEENT:  NC/AT,  conjunctivae clear and pink, no thyromegaly, nodules, adenopathy, no JVD, sclera -anicteric  CHEST:  Full & symmetric excursion, no increased effort, breath sounds clear  HEART:  Regular rhythm, S1, S2, no murmur/rub/S3/S4, no abdominal bruit, no edema  ABDOMEN:  Soft, non-tender, non-distended, normoactive bowel sounds,  no masses ,no hepato-splenomegaly, no signs of chronic liver disease  EXTEREMITIES:  no cyanosis,clubbing or edema  SKIN:  No rash/erythema/ecchymoses/petechiae/wounds/abscess/warm/dry  NEURO:  Alert, oriented, no asterixis, no tremor, no encephalopathy      LABS:                        12.8   4.82  )-----------( 133      ( 13 Aug 2017 07:30 )             39.3     08-13    142  |  103  |  19  ----------------------------<  96  4.6   |  28  |  1.08    Ca    9.5      13 Aug 2017 07:30  Phos  3.9     08-13  Mg     2.1     08-13          amylase   lipase  RADIOLOGY & ADDITIONAL TESTS:

## 2017-08-14 LAB
APTT BLD: 66.4 SEC — HIGH (ref 27.5–37.4)
APTT BLD: 75 SEC — HIGH (ref 27.5–37.4)
APTT BLD: > 200 SEC — CRITICAL HIGH (ref 27.5–37.4)
BUN SERPL-MCNC: 22 MG/DL — SIGNIFICANT CHANGE UP (ref 7–23)
CALCIUM SERPL-MCNC: 9.1 MG/DL — SIGNIFICANT CHANGE UP (ref 8.4–10.5)
CHLORIDE SERPL-SCNC: 104 MMOL/L — SIGNIFICANT CHANGE UP (ref 98–107)
CO2 SERPL-SCNC: 28 MMOL/L — SIGNIFICANT CHANGE UP (ref 22–31)
CREAT SERPL-MCNC: 1.07 MG/DL — SIGNIFICANT CHANGE UP (ref 0.5–1.3)
GLUCOSE SERPL-MCNC: 116 MG/DL — HIGH (ref 70–99)
HCT VFR BLD CALC: 35.9 % — SIGNIFICANT CHANGE UP (ref 34.5–45)
HGB BLD-MCNC: 11.8 G/DL — SIGNIFICANT CHANGE UP (ref 11.5–15.5)
MAGNESIUM SERPL-MCNC: 1.9 MG/DL — SIGNIFICANT CHANGE UP (ref 1.6–2.6)
MCHC RBC-ENTMCNC: 27.5 PG — SIGNIFICANT CHANGE UP (ref 27–34)
MCHC RBC-ENTMCNC: 32.9 % — SIGNIFICANT CHANGE UP (ref 32–36)
MCV RBC AUTO: 83.7 FL — SIGNIFICANT CHANGE UP (ref 80–100)
NRBC # FLD: 0 — SIGNIFICANT CHANGE UP
PHOSPHATE SERPL-MCNC: 3.9 MG/DL — SIGNIFICANT CHANGE UP (ref 2.5–4.5)
PLATELET # BLD AUTO: 122 K/UL — LOW (ref 150–400)
PMV BLD: 11.5 FL — SIGNIFICANT CHANGE UP (ref 7–13)
POTASSIUM SERPL-MCNC: 4.7 MMOL/L — SIGNIFICANT CHANGE UP (ref 3.5–5.3)
POTASSIUM SERPL-SCNC: 4.7 MMOL/L — SIGNIFICANT CHANGE UP (ref 3.5–5.3)
RBC # BLD: 4.29 M/UL — SIGNIFICANT CHANGE UP (ref 3.8–5.2)
RBC # FLD: 12.8 % — SIGNIFICANT CHANGE UP (ref 10.3–14.5)
SODIUM SERPL-SCNC: 141 MMOL/L — SIGNIFICANT CHANGE UP (ref 135–145)
WBC # BLD: 5.12 K/UL — SIGNIFICANT CHANGE UP (ref 3.8–10.5)
WBC # FLD AUTO: 5.12 K/UL — SIGNIFICANT CHANGE UP (ref 3.8–10.5)

## 2017-08-14 RX ADMIN — LISINOPRIL 40 MILLIGRAM(S): 2.5 TABLET ORAL at 05:07

## 2017-08-14 RX ADMIN — HEPARIN SODIUM 0 UNIT(S)/HR: 5000 INJECTION INTRAVENOUS; SUBCUTANEOUS at 05:07

## 2017-08-14 RX ADMIN — SODIUM CHLORIDE 3 MILLILITER(S): 9 INJECTION INTRAMUSCULAR; INTRAVENOUS; SUBCUTANEOUS at 05:08

## 2017-08-14 RX ADMIN — AMLODIPINE BESYLATE 5 MILLIGRAM(S): 2.5 TABLET ORAL at 05:07

## 2017-08-14 RX ADMIN — Medication 1 TABLET(S): at 13:50

## 2017-08-14 RX ADMIN — HEPARIN SODIUM 800 UNIT(S)/HR: 5000 INJECTION INTRAVENOUS; SUBCUTANEOUS at 13:49

## 2017-08-14 RX ADMIN — SODIUM CHLORIDE 3 MILLILITER(S): 9 INJECTION INTRAMUSCULAR; INTRAVENOUS; SUBCUTANEOUS at 22:46

## 2017-08-14 RX ADMIN — HEPARIN SODIUM 800 UNIT(S)/HR: 5000 INJECTION INTRAVENOUS; SUBCUTANEOUS at 06:09

## 2017-08-14 RX ADMIN — ATORVASTATIN CALCIUM 80 MILLIGRAM(S): 80 TABLET, FILM COATED ORAL at 22:46

## 2017-08-14 NOTE — PROGRESS NOTE ADULT - SUBJECTIVE AND OBJECTIVE BOX
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 The monitor recorded 32 beats NSVT today and <1 min of Afib. EMS was called and the pt was taken to the Ed.     In the Ed, the EKG shows sinus sage with a 1st * AVB.  The pt was seen by the Cardio team. Their consult and recommendations are in the chart.   The pt currently denies chest pain, palpitations SOB, nausea, vomit, dizziness, falls. (10 Aug 2017 23:30).    Events since saturday noted, pt is doing better and denies any active symptoms today. Daughter is with her.    Meds:  sodium chloride 0.9% lock flush 3 milliLiter(s) IV Push every 8 hours  lisinopril 40 milliGRAM(s) Oral daily  atorvastatin 80 milliGRAM(s) Oral at bedtime  metoprolol succinate ER 50 milliGRAM(s) Oral daily  amLODIPine   Tablet 5 milliGRAM(s) Oral daily  multivitamin 1 Tablet(s) Oral daily  heparin  Infusion.  Unit(s)/Hr IV Continuous <Continuous>  heparin  Injectable 6500 Unit(s) IV Push every 6 hours PRN  heparin  Injectable 3000 Unit(s) IV Push every 6 hours PRN      Vital Signs Last 24 Hrs  T(C): 36.6 (14 Aug 2017 13:36), Max: 36.7 (13 Aug 2017 21:10)  T(F): 97.9 (14 Aug 2017 13:36), Max: 98 (13 Aug 2017 21:10)  HR: 67 (14 Aug 2017 13:36) (56 - 67)  BP: 108/55 (14 Aug 2017 13:36) (104/63 - 120/72)  BP(mean): --  RR: 17 (14 Aug 2017 13:36) (16 - 17)  SpO2: 100% (14 Aug 2017 13:36) (99% - 100%)                          11.8   5.12  )-----------( 122      ( 14 Aug 2017 04:00 )             35.9       08-14    141  |  104  |  22  ----------------------------<  116<H>  4.7   |  28  |  1.07    Ca    9.1      14 Aug 2017 04:00  Phos  3.9     08-14  Mg     1.9     08-14      B!@, folate, CEA, CA 19.9, hepatitis profile and HIV 1/2 negative          PTT - ( 14 Aug 2017 11:49 )  PTT:75.0 SEC

## 2017-08-14 NOTE — PROVIDER CONTACT NOTE (CRITICAL VALUE NOTIFICATION) - ASSESSMENT
Patient is asymptomatic. Denies chest pain and shortness of breath. vital signs stable. No signs of bleeding present.

## 2017-08-14 NOTE — PROGRESS NOTE ADULT - SUBJECTIVE AND OBJECTIVE BOX
SUBJECTIVE:  No CP, SOB      MEDICATIONS  (STANDING):  sodium chloride 0.9% lock flush 3 milliLiter(s) IV Push every 8 hours  lisinopril 40 milliGRAM(s) Oral daily  atorvastatin 80 milliGRAM(s) Oral at bedtime  metoprolol succinate ER 50 milliGRAM(s) Oral daily  amLODIPine   Tablet 5 milliGRAM(s) Oral daily  multivitamin 1 Tablet(s) Oral daily  heparin  Infusion.  Unit(s)/Hr (14 mL/Hr) IV Continuous <Continuous>    MEDICATIONS  (PRN):  heparin  Injectable 6500 Unit(s) IV Push every 6 hours PRN For aPTT less than 40  heparin  Injectable 3000 Unit(s) IV Push every 6 hours PRN For aPTT between 40 - 57      LABS:                        11.8   5.12  )-----------( 122      ( 14 Aug 2017 04:00 )             35.9     Hemoglobin: 11.8 g/dL (08-14 @ 04:00)  Hemoglobin: 12.3 g/dL (08-13 @ 18:40)  Hemoglobin: 12.8 g/dL (08-13 @ 07:30)  Hemoglobin: 12.1 g/dL (08-12 @ 06:30)  Hemoglobin: 12.0 g/dL (08-11 @ 06:30)    08-14    141  |  104  |  22  ----------------------------<  116<H>  4.7   |  28  |  1.07    Ca    9.1      14 Aug 2017 04:00  Phos  3.9     08-14  Mg     1.9     08-14    Creatinine Trend: 1.07<--, 1.08<--, 1.07<--, 0.99<--, 1.02<--   PTT - ( 14 Aug 2017 04:00 )  PTT:> 200.0 SEC      PHYSICAL EXAM  Vital Signs Last 24 Hrs  T(C): 36.5 (14 Aug 2017 05:05), Max: 36.7 (13 Aug 2017 21:10)  T(F): 97.7 (14 Aug 2017 05:05), Max: 98 (13 Aug 2017 21:10)  HR: 56 (14 Aug 2017 05:05) (56 - 60)  BP: 104/63 (14 Aug 2017 05:05) (104/63 - 129/61)  RR: 16 (14 Aug 2017 05:05) (16 - 16)  SpO2: 99% (14 Aug 2017 05:05) (99% - 100%)      HEENT: Normal Oral mucosa, PERRL, EOMI  Lymphatic: No obvious lymphadenopathy, No edema  Cardiovascular: Normal S1S2, No JVD, 1/6 MARK, Peripheral pulses palpable 2+ B/L  Respiratory: Lungs clear to auscultation, normal effort  Gastrointestinal: Abdomen soft, ND, NT, +BS  Skin: Warm, dry, intact. No cyanosis, No rash.  Musculoskeletal: Normal ROM, normal strength  Psychiatric: Appropriate Mood and Affect      DIAGNOSTIC DATA  TELEMETRY: SB/SR    < from: US Abdominal Aorta (08.11.17 @ 11:00) >  IMPRESSION:     No evidence of abdominal aortic aneurysm. Correlated with pending CT.      ADILIA ALATORRE M.D., RADIOLOGYRESIDENT  This document has been electronically signed.  CHINTAN JARVIS M.D., ATTENDING RADIOLOGIST  This document has been electronically signed. Aug 11 2017 12:27PM    < end of copied text >    < from: CT Angio Abdomen and Pelvis w/ IV Cont (08.11.17 @ 15:34) >  IMPRESSION:   No evidence of aortic dissection.  No evidence of AAA. Tortuous descending abdominal aorta.  Dilatation of the pancreatic and common bile duct. Further evaluation   with endoscopy and/or MRCP is suggested.    BESS BERTRAND M.D., ATTENDING RADIOLOGIST  This document has been electronically signed. Aug 11 2017  4:40PM    < end of copied text >      ASSESSMENT AND PLAN:  84 year old female with PMH WPW ablation at [place] 2015, [facility], EF 47% in April 2017, Last NST 5/2017 with No ischemia, who has been having Dyspnea, and was placed on an event monitor through my office.  THe monitor recorded 32 beats NSVT today and <1 min of Afib.  --Per Dr Vazquez, patient had an abnormal Abdominal sono in our office so a repeat was done (as above) and a CTA was done  --GI eval called regarding CTA findings--for MRI abdomen today  --Repeat TTE with definity pending  --Continue telemetry  --plan for cardiac cath pending above work up  --Heparin Drip started for PAF, lifelong AC recommended.  Will decide on which AC agent after above work up      Mary Beth Reynoso PA-C  [facility]  [address], [address]   [place] [address]  office [phone]  pager [phone].

## 2017-08-14 NOTE — PROVIDER CONTACT NOTE (CRITICAL VALUE NOTIFICATION) - RECOMMENDATIONS
As per the heparin nomogram, heparin infusion will be stopped for 60 minutes, then restarted at 11 units/hr.

## 2017-08-14 NOTE — PROGRESS NOTE ADULT - SUBJECTIVE AND OBJECTIVE BOX
INTERVAL HPI/OVERNIGHT EVENTS:    pt seen bedside, reports no abdominal pain, no nausea or vomiting  upset she is in the hospital    MEDICATIONS  (STANDING):  sodium chloride 0.9% lock flush 3 milliLiter(s) IV Push every 8 hours  lisinopril 40 milliGRAM(s) Oral daily  atorvastatin 80 milliGRAM(s) Oral at bedtime  metoprolol succinate ER 50 milliGRAM(s) Oral daily  amLODIPine   Tablet 5 milliGRAM(s) Oral daily  multivitamin 1 Tablet(s) Oral daily  heparin  Infusion.  Unit(s)/Hr (14 mL/Hr) IV Continuous <Continuous>    MEDICATIONS  (PRN):  heparin  Injectable 6500 Unit(s) IV Push every 6 hours PRN For aPTT less than 40  heparin  Injectable 3000 Unit(s) IV Push every 6 hours PRN For aPTT between 40 - 57      Allergies    No Known Allergies    Intolerances        Review of Systems:    General:  No wt loss, fevers, chills, night sweats,fatigue,   Eyes:  Good vision, no reported pain  ENT:  No sore throat, pain, runny nose, dysphagia  CV:  No pain, palpitatioins, hypo/hypertension  Resp:  No dyspnea, cough, tachypnea, wheezing  GI:  No pain, No nausea, No vomiting, No diarrhea, No constipation, No weight loss, No fever, No pruritis, No rectal bleeding, No melenas, No dysphagia  :  No pain, bleeding, incontinence, nocturia  Muscle:  No pain, weakness  Neuro:  No weakness, tingling, memory problems  Psych:  No fatigue, insomnia, mood problems, depression  Endocrine:  No polyuria, polydypsia, cold/heat intolerance  Heme:  No petechiae, ecchymosis, easy bruisability  Skin:  No rash, tattoos, scars, edema      Vital Signs Last 24 Hrs  T(C): 36.5 (14 Aug 2017 05:05), Max: 36.7 (13 Aug 2017 21:10)  T(F): 97.7 (14 Aug 2017 05:05), Max: 98 (13 Aug 2017 21:10)  HR: 56 (14 Aug 2017 05:05) (56 - 60)  BP: 104/63 (14 Aug 2017 05:05) (104/63 - 129/61)  BP(mean): --  RR: 16 (14 Aug 2017 05:05) (16 - 16)  SpO2: 99% (14 Aug 2017 05:05) (99% - 100%)    PHYSICAL EXAM:    Constitutional: NAD, well-developed  HEENT: EOMI, throat clear  Neck: No LAD, supple  Respiratory: CTA and P  Cardiovascular: S1 and S2, RRR, no M  Gastrointestinal: BS+, soft, NT/ND, neg HSM,  Extremities: No peripheral edema, neg clubing, cyanosis  Vascular: 2+ peripheral pulses  Neurological: A/O x 3, no focal deficits  Psychiatric: Normal mood, normal affect  Skin: No rashes      LABS:                        11.8   5.12  )-----------( 122      ( 14 Aug 2017 04:00 )             35.9     08-14    141  |  104  |  22  ----------------------------<  116<H>  4.7   |  28  |  1.07    Ca    9.1      14 Aug 2017 04:00  Phos  3.9     08-14  Mg     1.9     08-14      PTT - ( 14 Aug 2017 04:00 )  PTT:> 200.0 SEC      RADIOLOGY & ADDITIONAL TESTS:  < from: CT Angio Abdomen and Pelvis w/ IV Cont (08.11.17 @ 15:34) >    EXAM:  CT ANGIO ABD PELV (W)AW IC      EXAM:  CT ANGIO CHEST (W)AW IC        PROCEDURE DATE:  Aug 11 2017         INTERPRETATION:  CLINICAL INFORMATION: Evaluate for AAA. Rule out   dissection. Abnormal sonogram.    COMPARISON: Sonogram of abdomen dated 08/11/2017.    PROCEDURE: CT scan of chest, abdomen and pelvis was obtained according to   standard protocol. Noncontrast images through the heart were followed by   uneventful intravenous injection of 90 cc of Omnipaque 350, 10 cc were   discarded.       FINDINGS:    CHEST:     LUNGS AND LARGE AIRWAYS: Patent central airways. Bibasilar subsegmental   atelectasis. No pulmonary nodules.  PLEURA: No pleural effusion.  VESSELS: No evidence of thoracic aortic dissection. Atherosclerotic   calcifications of aorta and coronary arteries.  HEART: Heart size is normal. No pericardial effusion.  MEDIASTINUM AND JODIE: No lymphadenopathy.  CHEST WALL AND LOWER NECK: Within normal limits.    ABDOMEN AND PELVIS:    LIVER: Within normal limits.  BILE DUCTS: Normal caliber.  GALLBLADDER: Small amount of sludge or tiny noncalcified dependent stone.  SPLEEN: Within normal limits.  PANCREAS: Dilatation of pancreatic and common bile duct to about 8 mm. No   definite mass seen. ADRENALS: Within normal limits.  KIDNEYS/URETERS: 5.1 x 5.7 cm left renal cysts.  BLADDER: Within normal limits.  REPRODUCTIVE ORGANS: Within normal limits.    BOWEL: No bowel obstruction. Appendix      PERITONEUM: No ascites.  VESSELS:  Tortuous abdominal aorta. No evidence of AAA.  RETROPERITONEUM: No lymphadenopathy.    ABDOMINAL WALL: Within normal limits.  BONES: Degenerative disease of spine..    IMPRESSION:   No evidence of aortic dissection.  No evidence of AAA. Tortuous descending abdominal aorta.  Dilatation of the pancreatic and common bile duct. Further evaluation   with endoscopy and/or MRCP is suggested.

## 2017-08-14 NOTE — PROVIDER CONTACT NOTE (CRITICAL VALUE NOTIFICATION) - BACKGROUND
Patient admitted for bradycardia on her holter monitor placed by her cardiologist. Patient has a history of hypertension, hypercholesteremia, and muñoz white ablation at [facility] in 2015.

## 2017-08-15 LAB
ANA TITR SER: NEGATIVE — SIGNIFICANT CHANGE UP
HCT VFR BLD CALC: 34.5 % — SIGNIFICANT CHANGE UP (ref 34.5–45)
HGB BLD-MCNC: 11.1 G/DL — LOW (ref 11.5–15.5)
MCHC RBC-ENTMCNC: 26.6 PG — LOW (ref 27–34)
MCHC RBC-ENTMCNC: 32.2 % — SIGNIFICANT CHANGE UP (ref 32–36)
MCV RBC AUTO: 82.5 FL — SIGNIFICANT CHANGE UP (ref 80–100)
NRBC # FLD: 0 — SIGNIFICANT CHANGE UP
PLATELET # BLD AUTO: 116 K/UL — LOW (ref 150–400)
PMV BLD: 11.9 FL — SIGNIFICANT CHANGE UP (ref 7–13)
RBC # BLD: 4.18 M/UL — SIGNIFICANT CHANGE UP (ref 3.8–5.2)
RBC # FLD: 12.9 % — SIGNIFICANT CHANGE UP (ref 10.3–14.5)
WBC # BLD: 4.6 K/UL — SIGNIFICANT CHANGE UP (ref 3.8–10.5)
WBC # FLD AUTO: 4.6 K/UL — SIGNIFICANT CHANGE UP (ref 3.8–10.5)

## 2017-08-15 PROCEDURE — 74183 MRI ABD W/O CNTR FLWD CNTR: CPT | Mod: 26

## 2017-08-15 RX ADMIN — LISINOPRIL 40 MILLIGRAM(S): 2.5 TABLET ORAL at 06:12

## 2017-08-15 RX ADMIN — SODIUM CHLORIDE 3 MILLILITER(S): 9 INJECTION INTRAMUSCULAR; INTRAVENOUS; SUBCUTANEOUS at 21:35

## 2017-08-15 RX ADMIN — Medication 1 TABLET(S): at 13:14

## 2017-08-15 RX ADMIN — SODIUM CHLORIDE 3 MILLILITER(S): 9 INJECTION INTRAMUSCULAR; INTRAVENOUS; SUBCUTANEOUS at 13:14

## 2017-08-15 RX ADMIN — SODIUM CHLORIDE 3 MILLILITER(S): 9 INJECTION INTRAMUSCULAR; INTRAVENOUS; SUBCUTANEOUS at 06:14

## 2017-08-15 RX ADMIN — Medication 50 MILLIGRAM(S): at 06:12

## 2017-08-15 RX ADMIN — AMLODIPINE BESYLATE 5 MILLIGRAM(S): 2.5 TABLET ORAL at 06:12

## 2017-08-15 RX ADMIN — ATORVASTATIN CALCIUM 80 MILLIGRAM(S): 80 TABLET, FILM COATED ORAL at 21:31

## 2017-08-15 RX ADMIN — HEPARIN SODIUM 800 UNIT(S)/HR: 5000 INJECTION INTRAVENOUS; SUBCUTANEOUS at 02:03

## 2017-08-15 NOTE — PROGRESS NOTE ADULT - SUBJECTIVE AND OBJECTIVE BOX
SUBJECTIVE:  No CP, SOB      MEDICATIONS  (STANDING):  sodium chloride 0.9% lock flush 3 milliLiter(s) IV Push every 8 hours  lisinopril 40 milliGRAM(s) Oral daily  atorvastatin 80 milliGRAM(s) Oral at bedtime  metoprolol succinate ER 50 milliGRAM(s) Oral daily  amLODIPine   Tablet 5 milliGRAM(s) Oral daily  multivitamin 1 Tablet(s) Oral daily  heparin  Infusion.  Unit(s)/Hr (14 mL/Hr) IV Continuous <Continuous>    MEDICATIONS  (PRN):  heparin  Injectable 6500 Unit(s) IV Push every 6 hours PRN For aPTT less than 40  heparin  Injectable 3000 Unit(s) IV Push every 6 hours PRN For aPTT between 40 - 57      LABS:                        11.1   4.60  )-----------( 116      ( 15 Aug 2017 06:00 )             34.5     Hemoglobin: 11.1 g/dL (08-15 @ 06:00)  Hemoglobin: 11.8 g/dL (08-14 @ 04:00)  Hemoglobin: 12.3 g/dL (08-13 @ 18:40)  Hemoglobin: 12.8 g/dL (08-13 @ 07:30)  Hemoglobin: 12.1 g/dL (08-12 @ 06:30)    08-14    141  |  104  |  22  ----------------------------<  116<H>  4.7   |  28  |  1.07    Ca    9.1      14 Aug 2017 04:00  Phos  3.9     08-14  Mg     1.9     08-14    Creatinine Trend: 1.07<--, 1.08<--, 1.07<--, 0.99<--, 1.02<--   PTT - ( 14 Aug 2017 19:35 )  PTT:66.4 SEC    PHYSICAL EXAM  Vital Signs Last 24 Hrs  T(C): 36.6 (15 Aug 2017 06:10), Max: 36.8 (14 Aug 2017 19:45)  T(F): 97.9 (15 Aug 2017 06:10), Max: 98.3 (14 Aug 2017 19:45)  HR: 59 (15 Aug 2017 09:00) (56 - 67)  BP: 123/64 (15 Aug 2017 09:00) (108/55 - 123/64)  RR: 16 (15 Aug 2017 06:10) (16 - 17)  SpO2: 98% (15 Aug 2017 06:10) (98% - 100%)      HEENT: Normal Oral mucosa, PERRL, EOMI  Lymphatic: No obvious lymphadenopathy, No edema  Cardiovascular: Normal S1S2, No JVD, 1/6 MARK, Peripheral pulses palpable 2+ B/L  Respiratory: Lungs clear to auscultation, normal effort  Gastrointestinal: Abdomen soft, ND, NT, +BS  Skin: Warm, dry, intact. No cyanosis, No rash.  Musculoskeletal: Normal ROM, normal strength  Psychiatric: Appropriate Mood and Affect      DIAGNOSTIC DATA  TELEMETRY: SB/SR    < from: US Abdominal Aorta (08.11.17 @ 11:00) >  IMPRESSION:     No evidence of abdominal aortic aneurysm. Correlated with pending CT.      ADILIA ALATORRE M.D., RADIOLOGYRESIDENT  This document has been electronically signed.  CHINTAN JARVIS M.D., ATTENDING RADIOLOGIST  This document has been electronically signed. Aug 11 2017 12:27PM    < end of copied text >    < from: CT Angio Abdomen and Pelvis w/ IV Cont (08.11.17 @ 15:34) >  IMPRESSION:   No evidence of aortic dissection.  No evidence of AAA. Tortuous descending abdominal aorta.  Dilatation of the pancreatic and common bile duct. Further evaluation   with endoscopy and/or MRCP is suggested.    BESS BERTRAND M.D., ATTENDING RADIOLOGIST  This document has been electronically signed. Aug 11 2017  4:40PM    < end of copied text >      ASSESSMENT AND PLAN:  84 year old female with PMH WPW ablation at [place] 2015, [facility], EF 47% in April 2017, Last NST 5/2017 with No ischemia, who has been having Dyspnea, and was placed on an event monitor through my office.  THe monitor recorded 32 beats NSVT today and <1 min of Afib.  --Per Dr Vazquez, patient had an abnormal Abdominal sono in our office so a repeat was done (as above) and a CTA was done  --GI eval called regarding CTA findings--for MRI abdomen today  --Repeat TTE with definity pending  --Continue telemetry  --plan for cardiac cath pending above work up  --Heparin Drip started for PAF, lifelong AC recommended.  Will decide on which AC agent after above work up      Mary Beth Reynoso PA-C  [facility]  [address], [address]   [place] [address]  office [phone]  pager [phone].

## 2017-08-15 NOTE — PROGRESS NOTE ADULT - SUBJECTIVE AND OBJECTIVE BOX
INTERVAL HPI/OVERNIGHT EVENTS:    pt reports no abdominal pain this morning  no episodes of nausea/vomiting  tolerating po intake    MEDICATIONS  (STANDING):  sodium chloride 0.9% lock flush 3 milliLiter(s) IV Push every 8 hours  lisinopril 40 milliGRAM(s) Oral daily  atorvastatin 80 milliGRAM(s) Oral at bedtime  metoprolol succinate ER 50 milliGRAM(s) Oral daily  amLODIPine   Tablet 5 milliGRAM(s) Oral daily  multivitamin 1 Tablet(s) Oral daily  heparin  Infusion.  Unit(s)/Hr (14 mL/Hr) IV Continuous <Continuous>    MEDICATIONS  (PRN):  heparin  Injectable 6500 Unit(s) IV Push every 6 hours PRN For aPTT less than 40  heparin  Injectable 3000 Unit(s) IV Push every 6 hours PRN For aPTT between 40 - 57      Allergies    No Known Allergies    Intolerances        Review of Systems:    General:  No wt loss, fevers, chills, night sweats,fatigue,   Eyes:  Good vision, no reported pain  ENT:  No sore throat, pain, runny nose, dysphagia  CV:  No pain, palpitatioins, hypo/hypertension  Resp:  No dyspnea, cough, tachypnea, wheezing  GI:  No pain, No nausea, No vomiting, No diarrhea, No constipation, No weight loss, No fever, No pruritis, No rectal bleeding, No melenas, No dysphagia  :  No pain, bleeding, incontinence, nocturia  Muscle:  No pain, weakness  Neuro:  No weakness, tingling, memory problems  Psych:  No fatigue, insomnia, mood problems, depression  Endocrine:  No polyuria, polydypsia, cold/heat intolerance  Heme:  No petechiae, ecchymosis, easy bruisability  Skin:  No rash, tattoos, scars, edema      Vital Signs Last 24 Hrs  T(C): 36.6 (15 Aug 2017 06:10), Max: 36.8 (14 Aug 2017 19:45)  T(F): 97.9 (15 Aug 2017 06:10), Max: 98.3 (14 Aug 2017 19:45)  HR: 59 (15 Aug 2017 09:00) (56 - 67)  BP: 123/64 (15 Aug 2017 09:00) (108/55 - 123/64)  BP(mean): --  RR: 16 (15 Aug 2017 06:10) (16 - 17)  SpO2: 98% (15 Aug 2017 06:10) (98% - 100%)    PHYSICAL EXAM:    Constitutional: NAD, well-developed  HEENT: EOMI, throat clear  Neck: No LAD, supple  Respiratory: CTA and P  Cardiovascular: S1 and S2, RRR, no M  Gastrointestinal: BS+, soft, NT/ND, neg HSM,  Extremities: No peripheral edema, neg clubing, cyanosis  Vascular: 2+ peripheral pulses  Neurological: A/O x 3, no focal deficits  Psychiatric: Normal mood, normal affect  Skin: No rashes      LABS:                        11.1   4.60  )-----------( 116      ( 15 Aug 2017 06:00 )             34.5     08-14    141  |  104  |  22  ----------------------------<  116<H>  4.7   |  28  |  1.07    Ca    9.1      14 Aug 2017 04:00  Phos  3.9     08-14  Mg     1.9     08-14      PTT - ( 14 Aug 2017 19:35 )  PTT:66.4 SEC      RADIOLOGY & ADDITIONAL TESTS:

## 2017-08-16 DIAGNOSIS — I48.0 PAROXYSMAL ATRIAL FIBRILLATION: ICD-10-CM

## 2017-08-16 LAB
APTT BLD: 39.1 SEC — HIGH (ref 27.5–37.4)
BUN SERPL-MCNC: 20 MG/DL — SIGNIFICANT CHANGE UP (ref 7–23)
CALCIUM SERPL-MCNC: 8.9 MG/DL — SIGNIFICANT CHANGE UP (ref 8.4–10.5)
CHLORIDE SERPL-SCNC: 104 MMOL/L — SIGNIFICANT CHANGE UP (ref 98–107)
CO2 SERPL-SCNC: 27 MMOL/L — SIGNIFICANT CHANGE UP (ref 22–31)
CREAT SERPL-MCNC: 0.98 MG/DL — SIGNIFICANT CHANGE UP (ref 0.5–1.3)
GLUCOSE SERPL-MCNC: 97 MG/DL — SIGNIFICANT CHANGE UP (ref 70–99)
HCT VFR BLD CALC: 35.6 % — SIGNIFICANT CHANGE UP (ref 34.5–45)
HGB BLD-MCNC: 11.5 G/DL — SIGNIFICANT CHANGE UP (ref 11.5–15.5)
MAGNESIUM SERPL-MCNC: 3 MG/DL — HIGH (ref 1.6–2.6)
MCHC RBC-ENTMCNC: 27 PG — SIGNIFICANT CHANGE UP (ref 27–34)
MCHC RBC-ENTMCNC: 32.3 % — SIGNIFICANT CHANGE UP (ref 32–36)
MCV RBC AUTO: 83.6 FL — SIGNIFICANT CHANGE UP (ref 80–100)
NRBC # FLD: 0 — SIGNIFICANT CHANGE UP
PHOSPHATE SERPL-MCNC: 3.9 MG/DL — SIGNIFICANT CHANGE UP (ref 2.5–4.5)
PLATELET # BLD AUTO: 115 K/UL — LOW (ref 150–400)
PMV BLD: 12.3 FL — SIGNIFICANT CHANGE UP (ref 7–13)
POTASSIUM SERPL-MCNC: 4 MMOL/L — SIGNIFICANT CHANGE UP (ref 3.5–5.3)
POTASSIUM SERPL-SCNC: 4 MMOL/L — SIGNIFICANT CHANGE UP (ref 3.5–5.3)
RBC # BLD: 4.26 M/UL — SIGNIFICANT CHANGE UP (ref 3.8–5.2)
RBC # FLD: 13 % — SIGNIFICANT CHANGE UP (ref 10.3–14.5)
SODIUM SERPL-SCNC: 140 MMOL/L — SIGNIFICANT CHANGE UP (ref 135–145)
WBC # BLD: 3.99 K/UL — SIGNIFICANT CHANGE UP (ref 3.8–10.5)
WBC # FLD AUTO: 3.99 K/UL — SIGNIFICANT CHANGE UP (ref 3.8–10.5)

## 2017-08-16 RX ORDER — HEPARIN SODIUM 5000 [USP'U]/ML
INJECTION INTRAVENOUS; SUBCUTANEOUS
Qty: 25000 | Refills: 0 | Status: DISCONTINUED | OUTPATIENT
Start: 2017-08-16 | End: 2017-08-17

## 2017-08-16 RX ORDER — HEPARIN SODIUM 5000 [USP'U]/ML
6500 INJECTION INTRAVENOUS; SUBCUTANEOUS EVERY 6 HOURS
Qty: 0 | Refills: 0 | Status: DISCONTINUED | OUTPATIENT
Start: 2017-08-16 | End: 2017-08-17

## 2017-08-16 RX ORDER — SODIUM CHLORIDE 9 MG/ML
500 INJECTION INTRAMUSCULAR; INTRAVENOUS; SUBCUTANEOUS
Qty: 0 | Refills: 0 | Status: DISCONTINUED | OUTPATIENT
Start: 2017-08-16 | End: 2017-08-18

## 2017-08-16 RX ORDER — HEPARIN SODIUM 5000 [USP'U]/ML
3000 INJECTION INTRAVENOUS; SUBCUTANEOUS EVERY 6 HOURS
Qty: 0 | Refills: 0 | Status: DISCONTINUED | OUTPATIENT
Start: 2017-08-16 | End: 2017-08-17

## 2017-08-16 RX ADMIN — SODIUM CHLORIDE 3 MILLILITER(S): 9 INJECTION INTRAMUSCULAR; INTRAVENOUS; SUBCUTANEOUS at 15:28

## 2017-08-16 RX ADMIN — HEPARIN SODIUM 1400 UNIT(S)/HR: 5000 INJECTION INTRAVENOUS; SUBCUTANEOUS at 23:34

## 2017-08-16 RX ADMIN — HEPARIN SODIUM 6500 UNIT(S): 5000 INJECTION INTRAVENOUS; SUBCUTANEOUS at 10:03

## 2017-08-16 RX ADMIN — LISINOPRIL 40 MILLIGRAM(S): 2.5 TABLET ORAL at 05:41

## 2017-08-16 RX ADMIN — SODIUM CHLORIDE 3 MILLILITER(S): 9 INJECTION INTRAMUSCULAR; INTRAVENOUS; SUBCUTANEOUS at 05:43

## 2017-08-16 RX ADMIN — HEPARIN SODIUM 6500 UNIT(S): 5000 INJECTION INTRAVENOUS; SUBCUTANEOUS at 23:35

## 2017-08-16 RX ADMIN — HEPARIN SODIUM 1100 UNIT(S)/HR: 5000 INJECTION INTRAVENOUS; SUBCUTANEOUS at 10:02

## 2017-08-16 RX ADMIN — SODIUM CHLORIDE 3 MILLILITER(S): 9 INJECTION INTRAMUSCULAR; INTRAVENOUS; SUBCUTANEOUS at 21:23

## 2017-08-16 RX ADMIN — ATORVASTATIN CALCIUM 80 MILLIGRAM(S): 80 TABLET, FILM COATED ORAL at 21:23

## 2017-08-16 RX ADMIN — AMLODIPINE BESYLATE 5 MILLIGRAM(S): 2.5 TABLET ORAL at 05:41

## 2017-08-16 NOTE — PROGRESS NOTE ADULT - SUBJECTIVE AND OBJECTIVE BOX
INTERVAL HPI/OVERNIGHT EVENTS:    denies n/v/d/c, abdominal pain, melena or brbpr   +brown bm    MEDICATIONS  (STANDING):  sodium chloride 0.9% lock flush 3 milliLiter(s) IV Push every 8 hours  lisinopril 40 milliGRAM(s) Oral daily  atorvastatin 80 milliGRAM(s) Oral at bedtime  metoprolol succinate ER 50 milliGRAM(s) Oral daily  amLODIPine   Tablet 5 milliGRAM(s) Oral daily  multivitamin 1 Tablet(s) Oral daily  heparin  Infusion.  Unit(s)/Hr (14 mL/Hr) IV Continuous <Continuous>    MEDICATIONS  (PRN):  heparin  Injectable 6500 Unit(s) IV Push every 6 hours PRN For aPTT less than 40  heparin  Injectable 3000 Unit(s) IV Push every 6 hours PRN For aPTT between 40 - 57      Allergies    No Known Allergies    Intolerances        Review of Systems:    General:  No wt loss, fevers, chills, night sweats, fatigue   Eyes:  Good vision, no reported pain  ENT:  No sore throat, pain, runny nose, dysphagia  CV:  No pain, palpitations, hypo/hypertension  Resp:  No dyspnea, cough, tachypnea, wheezing  GI:  No pain, No nausea, No vomiting, No diarrhea, No constipation, No weight loss, No fever, No pruritis, No rectal bleeding, No melena, No dysphagia  :  No pain, bleeding, incontinence, nocturia  Muscle:  No pain, weakness  Neuro:  No weakness, tingling, memory problems  Psych:  No fatigue, insomnia, mood problems, depression  Endocrine:  No polyuria, polydypsia, cold/heat intolerance  Heme:  No petechiae, ecchymosis, easy bruisability  Skin:  No rash, tattoos, scars, edema      Vital Signs Last 24 Hrs  T(C): 36.6 (16 Aug 2017 05:37), Max: 36.6 (15 Aug 2017 14:38)  T(F): 97.9 (16 Aug 2017 05:37), Max: 97.9 (16 Aug 2017 05:37)  HR: 54 (16 Aug 2017 05:37) (42 - 58)  BP: 106/53 (16 Aug 2017 05:37) (93/53 - 117/57)  BP(mean): --  RR: 16 (16 Aug 2017 05:37) (16 - 17)  SpO2: 100% (16 Aug 2017 05:37) (99% - 100%)    PHYSICAL EXAM:    Constitutional: NAD  HEENT: EOMI, throat clear  Neck: No LAD, supple  Respiratory: CTA and P  Cardiovascular: S1 and S2, RRR, no M  Gastrointestinal: BS+, soft, NT/ND, neg HSM,  Extremities: No peripheral edema, neg clubbing, cyanosis  Vascular: 2+ peripheral pulses  Neurological: A/O x 3, no focal deficits  Psychiatric: Normal mood, normal affect  Skin: No rashes      LABS:                        11.5   3.99  )-----------( 115      ( 16 Aug 2017 06:30 )             35.6     08-16    140  |  104  |  20  ----------------------------<  97  4.0   |  27  |  0.98    Ca    8.9      16 Aug 2017 06:30  Phos  3.9     08-16  Mg     3.0     08-16      PTT - ( 16 Aug 2017 06:30 )  PTT:39.1 SEC      RADIOLOGY & ADDITIONAL TESTS:    MRI Pancreas -- prelim pancreatic diverticulum, awaiting final report

## 2017-08-16 NOTE — CHART NOTE - NSCHARTNOTEFT_GEN_A_CORE
Patient is s/p cardiac cath. Patient without any complaints. RRB site is stable. No hematoma. Dressing is clean/intact/dry. 2+ radial pulse and good rap refill. Heparin gtt as per cardiology ordered for 2330 for PAF without bolus.  will monitor closely.

## 2017-08-16 NOTE — PROGRESS NOTE ADULT - SUBJECTIVE AND OBJECTIVE BOX
SUBJECTIVE:  No CP, SOB      MEDICATIONS  (STANDING):  sodium chloride 0.9% lock flush 3 milliLiter(s) IV Push every 8 hours  lisinopril 40 milliGRAM(s) Oral daily  atorvastatin 80 milliGRAM(s) Oral at bedtime  metoprolol succinate ER 50 milliGRAM(s) Oral daily  amLODIPine   Tablet 5 milliGRAM(s) Oral daily  multivitamin 1 Tablet(s) Oral daily  heparin  Infusion.  Unit(s)/Hr (14 mL/Hr) IV Continuous <Continuous>    MEDICATIONS  (PRN):  heparin  Injectable 6500 Unit(s) IV Push every 6 hours PRN For aPTT less than 40  heparin  Injectable 3000 Unit(s) IV Push every 6 hours PRN For aPTT between 40 - 57      LABS:                        11.5   3.99  )-----------( 115      ( 16 Aug 2017 06:30 )             35.6     Hemoglobin: 11.5 g/dL (08-16 @ 06:30)  Hemoglobin: 11.1 g/dL (08-15 @ 06:00)  Hemoglobin: 11.8 g/dL (08-14 @ 04:00)  Hemoglobin: 12.3 g/dL (08-13 @ 18:40)  Hemoglobin: 12.8 g/dL (08-13 @ 07:30)    08-16    140  |  104  |  20  ----------------------------<  97  4.0   |  27  |  0.98    Ca    8.9      16 Aug 2017 06:30  Phos  3.9     08-16  Mg     3.0     08-16    Creatinine Trend: 0.98<--, 1.07<--, 1.08<--, 1.07<--, 0.99<--, 1.02<--   PTT - ( 16 Aug 2017 06:30 )  PTT:39.1 SEC    PHYSICAL EXAM  Vital Signs Last 24 Hrs  T(C): 36.6 (16 Aug 2017 05:37), Max: 36.6 (15 Aug 2017 14:38)  T(F): 97.9 (16 Aug 2017 05:37), Max: 97.9 (16 Aug 2017 05:37)  HR: 54 (16 Aug 2017 05:37) (42 - 58)  BP: 106/53 (16 Aug 2017 05:37) (93/53 - 117/57)  RR: 16 (16 Aug 2017 05:37) (16 - 17)  SpO2: 100% (16 Aug 2017 05:37) (99% - 100%)      HEENT: Normal Oral mucosa, PERRL, EOMI  Lymphatic: No obvious lymphadenopathy, No edema  Cardiovascular: Normal S1S2, No JVD, 1/6 MARK, Peripheral pulses palpable 2+ B/L  Respiratory: Lungs clear to auscultation, normal effort  Gastrointestinal: Abdomen soft, ND, NT, +BS  Skin: Warm, dry, intact. No cyanosis, No rash.  Musculoskeletal: Normal ROM, normal strength  Psychiatric: Appropriate Mood and Affect      DIAGNOSTIC DATA  TELEMETRY: SB/SR    < from: US Abdominal Aorta (08.11.17 @ 11:00) >  IMPRESSION:     No evidence of abdominal aortic aneurysm. Correlated with pending CT.      ADILIA ALATORRE M.D., RADIOLOGYRESIDENT  This document has been electronically signed.  CHINTAN JARVIS M.D., ATTENDING RADIOLOGIST  This document has been electronically signed. Aug 11 2017 12:27PM    < end of copied text >    < from: CT Angio Abdomen and Pelvis w/ IV Cont (08.11.17 @ 15:34) >  IMPRESSION:   No evidence of aortic dissection.  No evidence of AAA. Tortuous descending abdominal aorta.  Dilatation of the pancreatic and common bile duct. Further evaluation   with endoscopy and/or MRCP is suggested.    BESS BERTRAND M.D., ATTENDING RADIOLOGIST  This document has been electronically signed. Aug 11 2017  4:40PM    < end of copied text >    < from: MRI Pancreas w/wo Cont (08.15.17 @ 21:08) >  IMPRESSION:   Pancreas divisum. No pancreatic mass.    YURY CARRILLO M.D., RADIOLOGY RESIDENT  This document has been electronically signed.  ALTON BLISS M.D., ATTENDING RADIOLOGIST  This document has been electronically signed. 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  THe monitor recorded 32 beats NSVT today and <1 min of Afib.  --CTA CAP done, no aortic aneurysm, tortuous descending aorta  --MRI pancreas noted above  --Repeat TTE with definity pending  --Continue telemetry  --Plan for cardiac cath today to r/o obs CAD  --Heparin Drip started for PAF, lifelong AC recommended.  Will decide on which AC agent after above work up      Mary Beth Reynoso PA-C  [facility]  [address], [address]   [place] [address]  office [phone]  pager [phone].

## 2017-08-17 ENCOUNTER — TRANSCRIPTION ENCOUNTER (OUTPATIENT)
Age: 82
End: 2017-08-17

## 2017-08-17 LAB
APTT BLD: 188.9 SEC — CRITICAL HIGH (ref 27.5–37.4)
APTT BLD: 66.2 SEC — HIGH (ref 27.5–37.4)
BUN SERPL-MCNC: 28 MG/DL — HIGH (ref 7–23)
CALCIUM SERPL-MCNC: 9.2 MG/DL — SIGNIFICANT CHANGE UP (ref 8.4–10.5)
CHLORIDE SERPL-SCNC: 106 MMOL/L — SIGNIFICANT CHANGE UP (ref 98–107)
CO2 SERPL-SCNC: 25 MMOL/L — SIGNIFICANT CHANGE UP (ref 22–31)
CREAT SERPL-MCNC: 1.18 MG/DL — SIGNIFICANT CHANGE UP (ref 0.5–1.3)
GLUCOSE SERPL-MCNC: 101 MG/DL — HIGH (ref 70–99)
HCT VFR BLD CALC: 36.9 % — SIGNIFICANT CHANGE UP (ref 34.5–45)
HGB BLD-MCNC: 11.9 G/DL — SIGNIFICANT CHANGE UP (ref 11.5–15.5)
MAGNESIUM SERPL-MCNC: 2.1 MG/DL — SIGNIFICANT CHANGE UP (ref 1.6–2.6)
MCHC RBC-ENTMCNC: 27 PG — SIGNIFICANT CHANGE UP (ref 27–34)
MCHC RBC-ENTMCNC: 32.2 % — SIGNIFICANT CHANGE UP (ref 32–36)
MCV RBC AUTO: 83.7 FL — SIGNIFICANT CHANGE UP (ref 80–100)
NRBC # FLD: 0 — SIGNIFICANT CHANGE UP
PLATELET # BLD AUTO: 127 K/UL — LOW (ref 150–400)
PMV BLD: 12.1 FL — SIGNIFICANT CHANGE UP (ref 7–13)
POTASSIUM SERPL-MCNC: 4.5 MMOL/L — SIGNIFICANT CHANGE UP (ref 3.5–5.3)
POTASSIUM SERPL-SCNC: 4.5 MMOL/L — SIGNIFICANT CHANGE UP (ref 3.5–5.3)
RBC # BLD: 4.41 M/UL — SIGNIFICANT CHANGE UP (ref 3.8–5.2)
RBC # FLD: 13 % — SIGNIFICANT CHANGE UP (ref 10.3–14.5)
SODIUM SERPL-SCNC: 144 MMOL/L — SIGNIFICANT CHANGE UP (ref 135–145)
WBC # BLD: 4.63 K/UL — SIGNIFICANT CHANGE UP (ref 3.8–10.5)
WBC # FLD AUTO: 4.63 K/UL — SIGNIFICANT CHANGE UP (ref 3.8–10.5)

## 2017-08-17 PROCEDURE — 93306 TTE W/DOPPLER COMPLETE: CPT | Mod: 26

## 2017-08-17 RX ORDER — APIXABAN 2.5 MG/1
5 TABLET, FILM COATED ORAL EVERY 12 HOURS
Qty: 0 | Refills: 0 | Status: DISCONTINUED | OUTPATIENT
Start: 2017-08-17 | End: 2017-08-18

## 2017-08-17 RX ORDER — AMLODIPINE BESYLATE 2.5 MG/1
1 TABLET ORAL
Qty: 0 | Refills: 0 | COMMUNITY

## 2017-08-17 RX ORDER — APIXABAN 2.5 MG/1
1 TABLET, FILM COATED ORAL
Qty: 60 | Refills: 0 | OUTPATIENT
Start: 2017-08-17 | End: 2017-09-16

## 2017-08-17 RX ADMIN — HEPARIN SODIUM 1100 UNIT(S)/HR: 5000 INJECTION INTRAVENOUS; SUBCUTANEOUS at 07:10

## 2017-08-17 RX ADMIN — LISINOPRIL 40 MILLIGRAM(S): 2.5 TABLET ORAL at 05:32

## 2017-08-17 RX ADMIN — ATORVASTATIN CALCIUM 80 MILLIGRAM(S): 80 TABLET, FILM COATED ORAL at 21:28

## 2017-08-17 RX ADMIN — SODIUM CHLORIDE 3 MILLILITER(S): 9 INJECTION INTRAMUSCULAR; INTRAVENOUS; SUBCUTANEOUS at 05:31

## 2017-08-17 RX ADMIN — SODIUM CHLORIDE 3 MILLILITER(S): 9 INJECTION INTRAMUSCULAR; INTRAVENOUS; SUBCUTANEOUS at 12:21

## 2017-08-17 RX ADMIN — Medication 1 TABLET(S): at 12:31

## 2017-08-17 RX ADMIN — SODIUM CHLORIDE 3 MILLILITER(S): 9 INJECTION INTRAMUSCULAR; INTRAVENOUS; SUBCUTANEOUS at 21:27

## 2017-08-17 RX ADMIN — HEPARIN SODIUM 1100 UNIT(S)/HR: 5000 INJECTION INTRAVENOUS; SUBCUTANEOUS at 15:11

## 2017-08-17 RX ADMIN — APIXABAN 5 MILLIGRAM(S): 2.5 TABLET, FILM COATED ORAL at 18:37

## 2017-08-17 RX ADMIN — AMLODIPINE BESYLATE 5 MILLIGRAM(S): 2.5 TABLET ORAL at 05:32

## 2017-08-17 RX ADMIN — HEPARIN SODIUM 0 UNIT(S)/HR: 5000 INJECTION INTRAVENOUS; SUBCUTANEOUS at 06:06

## 2017-08-17 NOTE — DISCHARGE NOTE ADULT - PLAN OF CARE
Continue metoprolol and eliquis prevent tachycardia and stroke and common bile duct dilation for outpatient follow up with gastroenterology blood pressure control Please take your medications as prescribed and abide by a heart healthy, low cholesterol diet. adequate cholesterol levels You were started on apixaban (Eliquis) which is a blood thinner medication. Continue taking metoprolol and follow up with your cardiologist within two weeks. heart rate control and blood thinner medication for prevention of  stroke Prevent progression of disease. Continue current medications.   Monitor your blood pressure. Continue Lipitor.

## 2017-08-17 NOTE — DIETITIAN INITIAL EVALUATION ADULT. - PERTINENT LABORATORY DATA
08-17 Na144 mmol/L Glu 101 mg/dL<H> K+ 4.5 mmol/L Cr  1.18 mg/dL BUN 28 mg/dL<H> Phos n/a   Alb n/a   PAB n/a

## 2017-08-17 NOTE — DISCHARGE NOTE ADULT - ABILITY TO HEAR (WITH HEARING AID OR HEARING APPLIANCE IF NORMALLY USED):
left [place]/Mildly to Moderately Impaired: difficulty hearing in some environments or speaker may need to increase volume or speak distinctly

## 2017-08-17 NOTE — DISCHARGE NOTE ADULT - ADDITIONAL INSTRUCTIONS
Follow-up with Dr. Vazquez within 1 week. Please call and make an appointment. Follow-up with Dr. Vazquez 9/1/17 @ 12 30 pm

## 2017-08-17 NOTE — DISCHARGE NOTE ADULT - CARE PLAN
Principal Discharge DX:	PAF (paroxysmal atrial fibrillation)  Goal:	prevent tachycardia and stroke  Instructions for follow-up, activity and diet:	Continue metoprolol and eliquis  Secondary Diagnosis:	CAD (coronary artery disease)  Secondary Diagnosis:	Essential hypertension  Secondary Diagnosis:	Hypercholesteremia  Secondary Diagnosis:	Pancreatic duct dilated  Instructions for follow-up, activity and diet:	and common bile duct dilation for outpatient follow up with gastroenterology Principal Discharge DX:	PAF (paroxysmal atrial fibrillation)  Goal:	heart rate control and blood thinner medication for prevention of  stroke  Instructions for follow-up, activity and diet:	You were started on apixaban (Eliquis) which is a blood thinner medication. Continue taking metoprolol and follow up with your cardiologist within two weeks.  Secondary Diagnosis:	CAD (coronary artery disease)  Instructions for follow-up, activity and diet:	Please take your medications as prescribed and abide by a heart healthy, low cholesterol diet.  Secondary Diagnosis:	Essential hypertension  Goal:	blood pressure control  Secondary Diagnosis:	Hypercholesteremia  Goal:	adequate cholesterol levels Principal Discharge DX:	PAF (paroxysmal atrial fibrillation)  Goal:	heart rate control and blood thinner medication for prevention of  stroke  Instructions for follow-up, activity and diet:	You were started on apixaban (Eliquis) which is a blood thinner medication. Continue taking metoprolol and follow up with your cardiologist within two weeks.  Secondary Diagnosis:	CAD (coronary artery disease)  Goal:	Prevent progression of disease.  Instructions for follow-up, activity and diet:	Please take your medications as prescribed and abide by a heart healthy, low cholesterol diet.  Secondary Diagnosis:	Essential hypertension  Goal:	blood pressure control  Instructions for follow-up, activity and diet:	Continue current medications.   Monitor your blood pressure.  Secondary Diagnosis:	Hypercholesteremia  Goal:	adequate cholesterol levels  Instructions for follow-up, activity and diet:	Continue Lipitor.

## 2017-08-17 NOTE — DISCHARGE NOTE ADULT - MEDICATION SUMMARY - MEDICATIONS TO TAKE
EMT/paramedic
I will START or STAY ON the medications listed below when I get home from the hospital:    ramipril 10 mg oral capsule  -- 2 tab(s) by mouth once a day  -- Indication: For Essential hypertension    Eliquis 5 mg oral tablet  -- 1 tab(s) by mouth 2 times a day  -- Check with your doctor before becoming pregnant.  It is very important that you take or use this exactly as directed.  Do not skip doses or discontinue unless directed by your doctor.  Obtain medical advice before taking any non-prescription drugs as some may affect the action of this medication.    -- Indication: For PAF (paroxysmal atrial fibrillation)    atorvastatin 80 mg oral tablet  -- 1 tab(s) by mouth once a day  -- Indication: For CAD (coronary artery disease)    metoprolol succinate 50 mg oral tablet, extended release  -- 1 tab(s) by mouth once a day  -- Indication: For CAD (coronary artery disease)    Super B Complex oral tablet  -- 1 tab(s) by mouth once a day  -- Indication: For supplement

## 2017-08-17 NOTE — PROGRESS NOTE ADULT - SUBJECTIVE AND OBJECTIVE BOX
SUBJECTIVE:  No CP, SOB      MEDICATIONS  (STANDING):  sodium chloride 0.9% lock flush 3 milliLiter(s) IV Push every 8 hours  lisinopril 40 milliGRAM(s) Oral daily  atorvastatin 80 milliGRAM(s) Oral at bedtime  metoprolol succinate ER 50 milliGRAM(s) Oral daily  amLODIPine   Tablet 5 milliGRAM(s) Oral daily  multivitamin 1 Tablet(s) Oral daily  sodium chloride 0.9%. 500 milliLiter(s) (75 mL/Hr) IV Continuous <Continuous>  heparin  Infusion.  Unit(s)/Hr (14 mL/Hr) IV Continuous <Continuous>    MEDICATIONS  (PRN):  heparin  Injectable 6500 Unit(s) IV Push every 6 hours PRN For aPTT less than 40  heparin  Injectable 3000 Unit(s) IV Push every 6 hours PRN For aPTT between 40 - 57      LABS:                        11.9   4.63  )-----------( 127      ( 17 Aug 2017 05:20 )             36.9     Hemoglobin: 11.9 g/dL (08-17 @ 05:20)  Hemoglobin: 11.5 g/dL (08-16 @ 06:30)  Hemoglobin: 11.1 g/dL (08-15 @ 06:00)  Hemoglobin: 11.8 g/dL (08-14 @ 04:00)  Hemoglobin: 12.3 g/dL (08-13 @ 18:40)    08-17    144  |  106  |  28<H>  ----------------------------<  101<H>  4.5   |  25  |  1.18    Ca    9.2      17 Aug 2017 05:20  Phos  3.9     08-16  Mg     2.1     08-17      Creatinine Trend: 1.18<--, 0.98<--, 1.07<--, 1.08<--, 1.07<--, 0.99<--   PTT - ( 17 Aug 2017 13:00 )  PTT:66.2 SEC        PHYSICAL EXAM  Vital Signs Last 24 Hrs  T(C): 36.8 (17 Aug 2017 15:48), Max: 36.9 (17 Aug 2017 05:30)  T(F): 98.2 (17 Aug 2017 15:48), Max: 98.4 (17 Aug 2017 05:30)  HR: 67 (17 Aug 2017 15:48) (57 - 67)  BP: 94/51 (17 Aug 2017 15:48) (94/51 - 145/78)  BP(mean): --  RR: 18 (17 Aug 2017 15:48) (18 - 18)  SpO2: 99% (17 Aug 2017 15:48) (99% - 100%)        Cardiovascular: Normal S1S2,RRR No JVD, 1/6 MARK, Peripheral pulses palpable 2+ B/L  Respiratory: Lungs clear to auscultation, normal effort  Gastrointestinal: Abdomen soft, ND, NT, +BS  Extremities no clubbing, cyanosis or edema b/l le's       DIAGNOSTIC DATA  TELEMETRY: SB/SR    < from: US Abdominal Aorta (08.11.17 @ 11:00) >  IMPRESSION:     No evidence of abdominal aortic aneurysm. Correlated with pending CT.      ADILIA ALATORRE M.D., RADIOLOGYRESIDENT  This document has been electronically signed.  CHINTAN JARVIS M.D., ATTENDING RADIOLOGIST  This document has been electronically signed. Aug 11 2017 12:27PM    < end of copied text >    < from: CT Angio Abdomen and Pelvis w/ IV Cont (08.11.17 @ 15:34) >  IMPRESSION:   No evidence of aortic dissection.  No evidence of AAA. Tortuous descending abdominal aorta.  Dilatation of the pancreatic and common bile duct. Further evaluation   with endoscopy and/or MRCP is suggested.    BESS BERTRAND M.D., ATTENDING RADIOLOGIST  This document has been electronically signed. Aug 11 2017  4:40PM    < end of copied text >    < from: MRI Pancreas w/wo Cont (08.15.17 @ 21:08) >  IMPRESSION:   Pancreas divisum. No pancreatic mass.    YURY CARRILLO M.D., RADIOLOGY RESIDENT  This document has been electronically signed.  ALTON BLISS M.D., ATTENDING RADIOLOGIST  This document has been electronically signed. 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  THe monitor recorded 32 beats NSVT today and <1 min of Afib.  --CTA C/A/P done, no aortic aneurysm, tortuous descending aorta  --MRCP showed Pancreas divisum. No pancreatic mass  --Repeat TTE with Definity pending  --Continue telemetry  --S/P CATH luminal disease   --Heparin Drip started for PAF, lifelong AC recommended.  Will decide on which AC agent after above work up  -- GI input appreciated no objection to a/c

## 2017-08-17 NOTE — PROVIDER CONTACT NOTE (CRITICAL VALUE NOTIFICATION) - BACKGROUND
84y old female admitted for bradycardia. The telemetry monitor recorded 32 beats NSVT as well as PAF. Patient had cath procedure done 8/16 and heparin drip was started at 14/ml/hr with bolus given as per order.

## 2017-08-17 NOTE — PROGRESS NOTE ADULT - SUBJECTIVE AND OBJECTIVE BOX
INTERVAL HPI/OVERNIGHT EVENTS:    denies n/v/d/c, abdominal pain, melena or brbpr   tolerating po intake    MEDICATIONS  (STANDING):  sodium chloride 0.9% lock flush 3 milliLiter(s) IV Push every 8 hours  lisinopril 40 milliGRAM(s) Oral daily  atorvastatin 80 milliGRAM(s) Oral at bedtime  metoprolol succinate ER 50 milliGRAM(s) Oral daily  amLODIPine   Tablet 5 milliGRAM(s) Oral daily  multivitamin 1 Tablet(s) Oral daily  sodium chloride 0.9%. 500 milliLiter(s) (75 mL/Hr) IV Continuous <Continuous>  heparin  Infusion.  Unit(s)/Hr (14 mL/Hr) IV Continuous <Continuous>    MEDICATIONS  (PRN):  heparin  Injectable 6500 Unit(s) IV Push every 6 hours PRN For aPTT less than 40  heparin  Injectable 3000 Unit(s) IV Push every 6 hours PRN For aPTT between 40 - 57      Allergies    No Known Allergies    Intolerances        Review of Systems:    General:  No wt loss, fevers, chills, night sweats, fatigue   Eyes:  Good vision, no reported pain  ENT:  No sore throat, pain, runny nose, dysphagia  CV:  No pain, palpitations, hypo/hypertension  Resp:  No dyspnea, cough, tachypnea, wheezing  GI:  No pain, No nausea, No vomiting, No diarrhea, No constipation, No weight loss, No fever, No pruritis, No rectal bleeding, No melena, No dysphagia  :  No pain, bleeding, incontinence, nocturia  Muscle:  No pain, weakness  Neuro:  No weakness, tingling, memory problems  Psych:  No fatigue, insomnia, mood problems, depression  Endocrine:  No polyuria, polydypsia, cold/heat intolerance  Heme:  No petechiae, ecchymosis, easy bruisability  Skin:  No rash, tattoos, scars, edema      Vital Signs Last 24 Hrs  T(C): 36.9 (17 Aug 2017 05:30), Max: 36.9 (17 Aug 2017 05:30)  T(F): 98.4 (17 Aug 2017 05:30), Max: 98.4 (17 Aug 2017 05:30)  HR: 57 (17 Aug 2017 05:30) (54 - 59)  BP: 110/64 (17 Aug 2017 05:30) (107/67 - 145/78)  BP(mean): --  RR: 18 (17 Aug 2017 05:30) (16 - 18)  SpO2: 100% (17 Aug 2017 05:30) (100% - 100%)    PHYSICAL EXAM:    Constitutional: NAD  HEENT: EOMI, throat clear  Neck: No LAD, supple  Respiratory: CTA and P  Cardiovascular: S1 and S2, RRR, no M  Gastrointestinal: BS+, soft, NT/ND, neg HSM,  Extremities: No peripheral edema, neg clubbing, cyanosis  Vascular: 2+ peripheral pulses  Neurological: A/O x 3, no focal deficits  Psychiatric: Normal mood, normal affect  Skin: No rashes      LABS:                        11.9   4.63  )-----------( 127      ( 17 Aug 2017 05:20 )             36.9     08-17    144  |  106  |  28<H>  ----------------------------<  101<H>  4.5   |  25  |  1.18    Ca    9.2      17 Aug 2017 05:20  Phos  3.9     08-16  Mg     2.1     08-17      PTT - ( 17 Aug 2017 05:20 )  PTT:188.9 SEC      RADIOLOGY & ADDITIONAL TESTS:    < from: MRI Pancreas w/wo Cont (08.15.17 @ 21:08) >    EXAM:  MRI PANCREAS W-W O C        PROCEDURE DATE:  Aug 15 2017         INTERPRETATION:  CLINICAL INFORMATION: Pancreatic ductal dilatation,   evaluate for pancreatic mass    COMPARISON: None.    PROCEDURE:   MRI of the abdomen was performed with and without intravenous contrast.   3D and radial MRCP were performed.  6 cc of Gadavist was administered, 1.5 cc was discarded.    FINDINGS:    LOWER CHEST: Within normal limits.    LIVER: Within normal limits.  BILE DUCTS: Normal caliber. No choledocholithiasis.  GALLBLADDER: Cholelithiasis.  SPLEEN: Within normal limits.  PANCREAS: Pancreas divisum. No pancreatic mass.  ADRENALS: Within normal limits.  KIDNEYS/URETERS: Bilateral renal cysts measuring up to 5.8 cm on the left.    VISUALIZED PORTIONS:    BOWEL: Within normal limits.   PERITONEUM: No ascites.  VESSELS: Within normal limits.  RETROPERITONEUM: No lymphadenopathy.    ABDOMINAL WALL: Within normal limits.  BONES: Within normal limits.    IMPRESSION:   Pancreas divisum. No pancreatic mass.

## 2017-08-17 NOTE — DISCHARGE NOTE ADULT - CARE PROVIDER_API CALL
Ananth Kunz), Cardiovascular Disease; Interventional Cardiology  [address]  [place] [address]  Phone: [phone]  Fax: [phone] Ananth Kunz), Cardiovascular Disease; Interventional Cardiology  [address] [place] [address]  Phone: [phone]  Fax: [phone]    Yana Vazquez), Cardiovascular Disease; Internal Medicine  [address] [place] [address]  Phone: [phone]  Fax: [phone]

## 2017-08-17 NOTE — DIETITIAN INITIAL EVALUATION ADULT. - OTHER INFO
Initial Dietitian Evaluation 2/2 to extended length of stay. Patient reports good PO intake at present and prior to admission. Patient denies any nausea/vomiting/diarrhea/constipation or difficulty chewing and swallowing. Pt is ordered for Ensure Enlive 240mls 2x daily (700kcal, 40g protein). PO intake encouraged.  Pt was made aware that RD remains available.

## 2017-08-17 NOTE — PROVIDER CONTACT NOTE (CRITICAL VALUE NOTIFICATION) - RECOMMENDATIONS
Follow heparin nomogram STOP infusion for 60 minutes, then DECREASE dose rate by: 300 Unit(s)/Hr (3 mL/Hr)

## 2017-08-17 NOTE — DISCHARGE NOTE ADULT - HOSPITAL COURSE
84F admitted for NSVT, bradycardia and PAF on Holter Monitor s/p ischemic work up which showed luminal disease for medical therapy. Patient started on eliquis for PAF. CT abd and MRI showed pancreatic and common bile duct dilation for outpatient follow up > patient stable for discharge on 8/18    + Bradycardia  + 32 bts NSVT  + PAF - on hep gtt  + Dilatation of pancreatic and CBD--> rec MRI, GI following  +Cardiac cath on 8/16 --> luminal disease      meds/test/consults  CXR Slight left hemidiaphragm elevation. Focal indistinct bibasilar opacities indeterminate for subsegmental atelectatic change or scarring or possible developing airspace disease.  BUN/ Creatinine= 17/ 1.02  CE negative x 2  EKG SB @ 57b/ min, QT/ QTC = 402/449  8/11--cardio- Check CT a/p to eval for AAA, -Cath monday if ct negative;   Heme consult to eval thrombocytopenia with Dr. Neely, -further workup pending above   8/11 CTA/c/a/p--No evidence of aortic dissection. No evidence of AAA. Tortuous descending abdominal aorta. Dilatation of the pancreatic and common bile duct. Further evaluation with endoscopy and/or MRCP is suggested.   8/12 GI: Abnormal CT of the abdomen. Recommendation: ca 19-9  mri of the pancreas.  8/12 CARDIO Paulino: F/u GI re CT findings. Eventual cath for NSVT. Eventual A/C for PAF on monitor..  8/12 HEME Wasil : 84F with mild TCP and slight dilatation of the pancreatic duct without any mass, no splenomegaly, going for cath on monday.  - obtain B12, folate, RF, FRIEDA  - hepatitis profile, HIV 1/2 - currently, the plt count is acceptable for cath - obtain GI evaluation for MRCP and or EUS - obtain CEA, CA 19-9  - will f/u - message sent for DR. Escalera and spoke to PA - rest of the Rx as per medicine and cardiology  8/13  Cards; Cath this week, life long AC, start hep gtt, f/u echo with definity to assure LVEF > 35% -cardiac cath this week for NSVT -eventual lifelong A/C for PAF  -start heparin drip  8/13 GI:  MRI of pancreas    8/14 HEME:  dilatation of the pancreatic duct without any mass, no splenomegaly, going for cath  recommend: - obtain iron studies, ferritin, retic, LD, hapto - will f/u  - no contraindication to cardiac cath - rest of the Rx as per medicine and cardiology  - discussed with the pt and her daughter and questions answered  8/14 GI:  Abnormal CT of the abdomen.  - incidental finding of pancreatic and cbd dilatation on CT Abd; no mass noted - cmp qd - hepatitis panel wnl & lft/bilirubin remain wnl; Ca 19-9 normal - MRCP pending - further recommendations pending MRCP results.   8/14 CARDIO Paulino; The monitor recorded 32 beats NSVT as well as PAF.  -f/u GI re CT findings -f/u echo with definity to assure LVEF > 35% -cardiac cath this week for NSVT  -lifelong A/C for PAF. Continue heparin drip for now.   8/15: Cardio: -Cath after MRI and GI workup complete  8/15: GI: further management pending MRCP  8/16 MRCP: Pancreas divisum. No pancreatic mass.  8/16 LHC: luminal, LVEDP 10  8/16 Cardio: -Cath with no significant CAD to explain VT  8/16 GI: incidental finding of pancreatic and cbd dilatation on CT Abd  - hepatitis panel wnl & lft/bilirubin remain wnl; Ca 19-9 normal MRCP w/prelim pancreatic divisum; will await final report and if no other findings no gi objection to cardiac cath as d/w cardio pa  GI consult: No GI objection to dc  TTE: Mitral annular calcification, otherwise normal mitral valve. Minimal mitral regurgitation.Endocardium not well visualized; grossly normal leftventricular systolic function.  Endocardial visualization enhanced with intravenous injection of echo contrast (Definity).The right ventricle is not well visualized; grossly normal right ventricular systolic function. 84F admitted for NSVT, bradycardia and PAF on Holter Monitor s/p ischemic work up which showed luminal disease for medical therapy. Patient started on eliquis for PAF. CT abd  showed pancreatic and common bile duct dilation, MRCP showed Pancreas divisum. > patient stable for discharge on 8/18 84F admitted for NSVT, bradycardia and PAF on Holter Monitor s/p ischemic work up which showed luminal disease for medical therapy. Patient started on eliquis for PAF. CT abd  showed pancreatic and common bile duct dilation, MRCP showed Pancreas divisum. > patient stable for discharge on 8/18 8/18/17 Pt is medically stable for discharge home today as per Dr. Kunz.

## 2017-08-17 NOTE — DIETITIAN INITIAL EVALUATION ADULT. - NS FNS REASON FOR WEIGHT CHANG
fluid loss/other (specify)/Pt states that her usual Wt. is 182 pounds, Current Wt. is 173.7 pounds (8/17) admission Wt. 173 pounds (8/10). Per stated Wt. suggestive of Wt. loss ( ~10 pounds .) Pt is not able to provide [place] of usual Wt. and does not suspect Wt. loss

## 2017-08-17 NOTE — DISCHARGE NOTE ADULT - NS AS ACTIVITY OBS
Showering allowed/No Heavy lifting/straining/Do not make important decisions/Do not drive or operate machinery Showering allowed/Walking-Outdoors allowed/Walking-Indoors allowed/No Heavy lifting/straining

## 2017-08-17 NOTE — DISCHARGE NOTE ADULT - PATIENT PORTAL LINK FT
“You can access the [facility] Patient Portal, offered by [facility], by registering with the following website: [email or web address]”

## 2017-08-18 VITALS
SYSTOLIC BLOOD PRESSURE: 118 MMHG | DIASTOLIC BLOOD PRESSURE: 65 MMHG | HEART RATE: 68 BPM | RESPIRATION RATE: 17 BRPM | TEMPERATURE: 98 F | OXYGEN SATURATION: 99 %

## 2017-08-18 LAB
APTT BLD: 28.5 SEC — SIGNIFICANT CHANGE UP (ref 27.5–37.4)
BASOPHILS # BLD AUTO: 0.01 K/UL — SIGNIFICANT CHANGE UP (ref 0–0.2)
BASOPHILS NFR BLD AUTO: 0.3 % — SIGNIFICANT CHANGE UP (ref 0–2)
BUN SERPL-MCNC: 24 MG/DL — HIGH (ref 7–23)
CALCIUM SERPL-MCNC: 9.5 MG/DL — SIGNIFICANT CHANGE UP (ref 8.4–10.5)
CHLORIDE SERPL-SCNC: 103 MMOL/L — SIGNIFICANT CHANGE UP (ref 98–107)
CO2 SERPL-SCNC: 26 MMOL/L — SIGNIFICANT CHANGE UP (ref 22–31)
CREAT SERPL-MCNC: 1.09 MG/DL — SIGNIFICANT CHANGE UP (ref 0.5–1.3)
EOSINOPHIL # BLD AUTO: 0.35 K/UL — SIGNIFICANT CHANGE UP (ref 0–0.5)
EOSINOPHIL NFR BLD AUTO: 9.3 % — HIGH (ref 0–6)
GLUCOSE SERPL-MCNC: 103 MG/DL — HIGH (ref 70–99)
HCT VFR BLD CALC: 35.7 % — SIGNIFICANT CHANGE UP (ref 34.5–45)
HGB BLD-MCNC: 11.6 G/DL — SIGNIFICANT CHANGE UP (ref 11.5–15.5)
IMM GRANULOCYTES # BLD AUTO: 0 # — SIGNIFICANT CHANGE UP
IMM GRANULOCYTES NFR BLD AUTO: 0 % — SIGNIFICANT CHANGE UP (ref 0–1.5)
INR BLD: 1.1 — SIGNIFICANT CHANGE UP (ref 0.88–1.17)
LYMPHOCYTES # BLD AUTO: 1.68 K/UL — SIGNIFICANT CHANGE UP (ref 1–3.3)
LYMPHOCYTES # BLD AUTO: 44.8 % — HIGH (ref 13–44)
MAGNESIUM SERPL-MCNC: 2.1 MG/DL — SIGNIFICANT CHANGE UP (ref 1.6–2.6)
MCHC RBC-ENTMCNC: 27 PG — SIGNIFICANT CHANGE UP (ref 27–34)
MCHC RBC-ENTMCNC: 32.5 % — SIGNIFICANT CHANGE UP (ref 32–36)
MCV RBC AUTO: 83.2 FL — SIGNIFICANT CHANGE UP (ref 80–100)
MONOCYTES # BLD AUTO: 0.35 K/UL — SIGNIFICANT CHANGE UP (ref 0–0.9)
MONOCYTES NFR BLD AUTO: 9.3 % — SIGNIFICANT CHANGE UP (ref 2–14)
NEUTROPHILS # BLD AUTO: 1.36 K/UL — LOW (ref 1.8–7.4)
NEUTROPHILS NFR BLD AUTO: 36.3 % — LOW (ref 43–77)
NRBC # FLD: 0 — SIGNIFICANT CHANGE UP
PLATELET # BLD AUTO: 127 K/UL — LOW (ref 150–400)
PMV BLD: 12.4 FL — SIGNIFICANT CHANGE UP (ref 7–13)
POTASSIUM SERPL-MCNC: 4.4 MMOL/L — SIGNIFICANT CHANGE UP (ref 3.5–5.3)
POTASSIUM SERPL-SCNC: 4.4 MMOL/L — SIGNIFICANT CHANGE UP (ref 3.5–5.3)
PROTHROM AB SERPL-ACNC: 12.4 SEC — SIGNIFICANT CHANGE UP (ref 9.8–13.1)
RBC # BLD: 4.29 M/UL — SIGNIFICANT CHANGE UP (ref 3.8–5.2)
RBC # FLD: 13.1 % — SIGNIFICANT CHANGE UP (ref 10.3–14.5)
SODIUM SERPL-SCNC: 140 MMOL/L — SIGNIFICANT CHANGE UP (ref 135–145)
WBC # BLD: 3.75 K/UL — LOW (ref 3.8–10.5)
WBC # FLD AUTO: 3.75 K/UL — LOW (ref 3.8–10.5)

## 2017-08-18 RX ADMIN — Medication 1 TABLET(S): at 11:19

## 2017-08-18 RX ADMIN — ATORVASTATIN CALCIUM 80 MILLIGRAM(S): 80 TABLET, FILM COATED ORAL at 21:04

## 2017-08-18 RX ADMIN — APIXABAN 5 MILLIGRAM(S): 2.5 TABLET, FILM COATED ORAL at 17:35

## 2017-08-18 RX ADMIN — APIXABAN 5 MILLIGRAM(S): 2.5 TABLET, FILM COATED ORAL at 05:32

## 2017-08-18 RX ADMIN — SODIUM CHLORIDE 3 MILLILITER(S): 9 INJECTION INTRAMUSCULAR; INTRAVENOUS; SUBCUTANEOUS at 05:31

## 2017-08-18 RX ADMIN — LISINOPRIL 40 MILLIGRAM(S): 2.5 TABLET ORAL at 05:32

## 2017-08-18 RX ADMIN — SODIUM CHLORIDE 3 MILLILITER(S): 9 INJECTION INTRAMUSCULAR; INTRAVENOUS; SUBCUTANEOUS at 13:02

## 2017-08-18 RX ADMIN — AMLODIPINE BESYLATE 5 MILLIGRAM(S): 2.5 TABLET ORAL at 05:32

## 2017-08-18 NOTE — PROGRESS NOTE ADULT - PROBLEM SELECTOR PLAN 2
- cardiology faviola in progress  - tte pending
- cardiology faviola in progress  - tte pending
-cbc qd  -ppi while on ac  -no gi objection to ac
-cbc qd  -ppi while on ac  -no gi objection to ac for afib
- cardiology faviola in progress  - tte pending

## 2017-08-18 NOTE — PROGRESS NOTE ADULT - ASSESSMENT
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 Pt noted to have incidental finding of pancreatic and cbd dilatation on CT Abd
83yo F h/o WPW ablation at [place] 2015, NICM, EF 47% in April 2017, Last NST 5/2017 with No ischemia, who has been having Dyspnea, and was placed Holter monitor 8/9/17 for bradycardia. Pt noted to have incidental finding of pancreatic and cbd dilatation on CT Abd
85yo F h/o WPW ablation at [place] 2015, NICM, EF 47% in April 2017, Last NST 5/2017 with No ischemia, who has been having Dyspnea, and was placed Holter monitor 8/9/17 for bradycardia. Pt noted to have incidental finding of pancreatic and cbd dilatation on CT Abd
85yo F h/o WPW ablation at [place] 2015, NICM, EF 47% in April 2017, Last NST 5/2017 with No ischemia, who has been having Dyspnea, and was placed Holter monitor 8/9/17 for bradycardia. Pt noted to have incidental finding of pancreatic and cbd dilatation on CT Abd
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 Pt noted to have incidental finding of pancreatic and cbd dilatation on CT Abd
84F with mild TCP and slight dilatation of the pancreatic duct without any mass, no splenomegaly, going for cath on monday. Will recommend:  - obtain iron studies, ferritin, retic, LD, hapto  - will f/u  - no contraindication to cardiac cath  - rest of the Rx as per medicine and cardiology  - discussed with the pt and her daughter and questions answered

## 2017-08-18 NOTE — PROGRESS NOTE ADULT - SUBJECTIVE AND OBJECTIVE BOX
INTERVAL HPI/OVERNIGHT EVENTS:    pt reports no abdominal pain  +flatus, denied bm this morning  eating well    MEDICATIONS  (STANDING):  sodium chloride 0.9% lock flush 3 milliLiter(s) IV Push every 8 hours  lisinopril 40 milliGRAM(s) Oral daily  atorvastatin 80 milliGRAM(s) Oral at bedtime  metoprolol succinate ER 50 milliGRAM(s) Oral daily  amLODIPine   Tablet 5 milliGRAM(s) Oral daily  multivitamin 1 Tablet(s) Oral daily  sodium chloride 0.9%. 500 milliLiter(s) (75 mL/Hr) IV Continuous <Continuous>  apixaban 5 milliGRAM(s) Oral every 12 hours    MEDICATIONS  (PRN):      Allergies    No Known Allergies    Intolerances        Review of Systems:    General:  No wt loss, fevers, chills, night sweats, fatigue   Eyes:  Good vision, no reported pain  ENT:  No sore throat, pain, runny nose, dysphagia  CV:  No pain, palpitations, hypo/hypertension  Resp:  No dyspnea, cough, tachypnea, wheezing  GI:  No pain, No nausea, No vomiting, No diarrhea, No constipation, No weight loss, No fever, No pruritis, No rectal bleeding, No melena, No dysphagia  :  No pain, bleeding, incontinence, nocturia  Muscle:  No pain, weakness  Neuro:  No weakness, tingling, memory problems  Psych:  No fatigue, insomnia, mood problems, depression  Endocrine:  No polyuria, polydypsia, cold/heat intolerance  Heme:  No petechiae, ecchymosis, easy bruisability  Skin:  No rash, tattoos, scars, edema      Vital Signs Last 24 Hrs  T(C): 36.9 (18 Aug 2017 05:30), Max: 36.9 (17 Aug 2017 21:25)  T(F): 98.4 (18 Aug 2017 05:30), Max: 98.4 (17 Aug 2017 21:25)  HR: 57 (18 Aug 2017 05:30) (57 - 67)  BP: 105/63 (18 Aug 2017 05:30) (94/51 - 105/63)  BP(mean): --  RR: 18 (18 Aug 2017 05:30) (18 - 18)  SpO2: 99% (18 Aug 2017 05:30) (99% - 99%)    PHYSICAL EXAM:    Constitutional: NAD  HEENT: EOMI, throat clear  Neck: No LAD, supple  Respiratory: CTA and P  Cardiovascular: S1 and S2, RRR, no M  Gastrointestinal: BS+, soft, NT/ND, neg HSM,  Extremities: No peripheral edema, neg clubbing, cyanosis  Vascular: 2+ peripheral pulses  Neurological: A/O x 3, no focal deficits  Psychiatric: Normal mood, normal affect  Skin: No rashes      LABS:                        11.6   3.75  )-----------( 127      ( 18 Aug 2017 05:40 )             35.7     08-18    140  |  103  |  24<H>  ----------------------------<  103<H>  4.4   |  26  |  1.09    Ca    9.5      18 Aug 2017 05:40  Mg     2.1     08-18      PT/INR - ( 18 Aug 2017 05:40 )   PT: 12.4 SEC;   INR: 1.10          PTT - ( 18 Aug 2017 05:40 )  PTT:28.5 SEC      RADIOLOGY & ADDITIONAL TESTS:

## 2017-08-18 NOTE — PROGRESS NOTE ADULT - PROBLEM SELECTOR PLAN 3
-cardiology following  -s/p cardiac cath with luminal disease
-cardiology following  -s/p cardiac cath with luminal disease  -tte pending
-cbc qd  -ppi while on ac  -no gi objection to ac

## 2017-08-18 NOTE — PROGRESS NOTE ADULT - ATTENDING COMMENTS
CARDIOLOGY ATTENDING    Agree with above. F/u GI re CT findings. Eventual cath for NSVT. Eventual A/C for PAF on monitor.
Patient seen and examined.  Agree with above.   -Cath after MRI and GI workup complete    Samantha Escalera MD  [facility]  [address], [address]  [place] [address]  office: [phone]  pager: [phone]
Patient seen and examined.  Agree with above.   -Cath with no significant CAD to explain VT  -f/u LUNA Escalera MD  [facility]  [address] [place] [address], [address]  [place] [address]  office: [phone]  pager: [phone]
Pt. seen and examined.  Agree with above.   -Check CT a/p to eval for AAA  -Cath monday if ct negative  -Heme consult to eval thrombocytopenia with Dr. Neely  -further workup pending above     Samantha Escalera MD  [facility]  [address] [place] [address], [address]  [place] [address]  office: [phone]  pager: [phone]
Agree with above. 84 year old female with PMH WPW ablation at [place] (2015), NICM EF 47% in April 2017, Last NST 5/2017 with no ischemia, who has been having Dyspnea, and was placed on an event monitor through my office.  The monitor recorded 32 beats NSVT as well as PAF.     -f/u GI re CT findings  -f/u echo with definity to assure LVEF > 35%  -cardiac cath this week for NSVT  -lifelong A/C for PAF. Continue heparin drip for now
Patient seen and examined.  Agree with above.   -DC planning     Samantha Escalera MD  [facility]  [address], [address]  [place] [address]  office: [phone]  pager: [phone]

## 2017-08-18 NOTE — PROGRESS NOTE ADULT - PROBLEM SELECTOR PROBLEM 1
Abnormal CT of the abdomen

## 2017-08-18 NOTE — PROGRESS NOTE ADULT - PROBLEM SELECTOR PLAN 1
- incidental finding of pancreatic and cbd dilatation on CT Abd  - cmp qd  - hepatitis panel wnl & lft/bilirubin remain wnl; Ca 19-9 normal  - MRCP pending  - further recommendations pending MRCP results
- incidental finding of pancreatic and cbd dilatation on CT Abd  - cmp qd  - hepatitis panel wnl & lft/bilirubin remain wnl; Ca 19-9 normal  - MRCP showed Pancreas divisum. No pancreatic mass  - no gi objection to dc planning as per primary team
- incidental finding of pancreatic and cbd dilatation on CT Abd  - cmp qd  - hepatitis panel wnl & lft/bilirubin remain wnl; Ca 19-9 normal  - MRCP w/prelim pancreatic divisum; will await final report and if no other findings no gi objection to cardiac cath as d/w cardio pa
- incidental finding of pancreatic and cbd dilatation on CT Abd 8/11  - cmp qd  - hepatitis panel wnl & lft/bilirubin remain wnl; Ca 19-9 normal  - MRCP 8/15 showed Pancreas divisum. No pancreatic mass  - no gi objection to dc planning as per primary team
f/u labs  mri of the pancreas is pending
- incidental finding of pancreatic and cbd dilatation on CT Abd; no mass noted  - cmp qd  - hepatitis panel wnl & lft/bilirubin remain wnl; Ca 19-9 normal  - MRCP pending  - further recommendations pending MRCP results

## 2017-08-18 NOTE — PROGRESS NOTE ADULT - SUBJECTIVE AND OBJECTIVE BOX
SUBJECTIVE:  No CP, SOB    MEDICATIONS  (STANDING):  sodium chloride 0.9% lock flush 3 milliLiter(s) IV Push every 8 hours  lisinopril 40 milliGRAM(s) Oral daily  atorvastatin 80 milliGRAM(s) Oral at bedtime  metoprolol succinate ER 50 milliGRAM(s) Oral daily  amLODIPine   Tablet 5 milliGRAM(s) Oral daily  multivitamin 1 Tablet(s) Oral daily  sodium chloride 0.9%. 500 milliLiter(s) (75 mL/Hr) IV Continuous <Continuous>  apixaban 5 milliGRAM(s) Oral every 12 hours    MEDICATIONS  (PRN):      LABS:                        11.6   3.75  )-----------( 127      ( 18 Aug 2017 05:40 )             35.7     Hemoglobin: 11.6 g/dL (08-18 @ 05:40)  Hemoglobin: 11.9 g/dL (08-17 @ 05:20)  Hemoglobin: 11.5 g/dL (08-16 @ 06:30)  Hemoglobin: 11.1 g/dL (08-15 @ 06:00)  Hemoglobin: 11.8 g/dL (08-14 @ 04:00)    08-18    140  |  103  |  24<H>  ----------------------------<  103<H>  4.4   |  26  |  1.09    Ca    9.5      18 Aug 2017 05:40  Mg     2.1     08-18      Creatinine Trend: 1.09<--, 1.18<--, 0.98<--, 1.07<--, 1.08<--, 1.07<--   PT/INR - ( 18 Aug 2017 05:40 )   PT: 12.4 SEC;   INR: 1.10          PTT - ( 18 Aug 2017 05:40 )  PTT:28.5 SEC        PHYSICAL EXAM  Vital Signs Last 24 Hrs  T(C): 36.8 (18 Aug 2017 13:35), Max: 36.9 (17 Aug 2017 21:25)  T(F): 98.3 (18 Aug 2017 13:35), Max: 98.4 (17 Aug 2017 21:25)  HR: 73 (18 Aug 2017 13:35) (57 - 73)  BP: 120/69 (18 Aug 2017 13:35) (94/51 - 120/69)  BP(mean): --  RR: 18 (18 Aug 2017 13:35) (18 - 18)  SpO2: 99% (18 Aug 2017 13:35) (99% - 99%)          Cardiovascular: Normal S1S2,RRR No JVD, 1/6 MARK, Peripheral pulses palpable 2+ B/L  Respiratory: Lungs clear to auscultation, normal effort  Gastrointestinal: Abdomen soft, ND, NT, +BS  Extremities no clubbing, cyanosis or edema b/l le's       DIAGNOSTIC DATA  TELEMETRY: SB/SR    < from: US Abdominal Aorta (08.11.17 @ 11:00) >  IMPRESSION:     No evidence of abdominal aortic aneurysm. Correlated with pending CT.      ADILIA ALATORRE M.D., RADIOLOGYRESIDENT  This document has been electronically signed.  CHINTAN JARVIS M.D., ATTENDING RADIOLOGIST  This document has been electronically signed. Aug 11 2017 12:27PM    < end of copied text >    < from: CT Angio Abdomen and Pelvis w/ IV Cont (08.11.17 @ 15:34) >  IMPRESSION:   No evidence of aortic dissection.  No evidence of AAA. Tortuous descending abdominal aorta.  Dilatation of the pancreatic and common bile duct. Further evaluation   with endoscopy and/or MRCP is suggested.    BESS BERTRAND M.D., ATTENDING RADIOLOGIST  This document has been electronically signed. Aug 11 2017  4:40PM    < end of copied text >    < from: MRI Pancreas w/wo Cont (08.15.17 @ 21:08) >  IMPRESSION:   Pancreas divisum. No pancreatic mass.    YURY CARRILLO M.D., RADIOLOGY RESIDENT  This document has been electronically signed.  ALTON BLISS M.D., ATTENDING RADIOLOGIST  This document has been electronically signed. Aug 16 2017 12:03PM    < end of copied text >    ECHO   1. Mitral annular calcification, otherwise normal mitral  valve. Minimal mitral regurgitation.  2. Endocardium not well visualized; grossly normal left  ventricular systolic function.  Endocardial visualization  enhanced with intravenous injection of echo contrast  (Definity).  3. The right ventricle is not well visualized; grossly  normal right ventricular systolic function.      ASSESSMENT AND PLAN:  84 year old female with PMH WPW ablation at [place] 2015, NIC, EF 47% in April 2017, Last NST 5/2017 with No ischemia, who has been having Dyspnea, and was placed on an event monitor through my office.  THe monitor recorded 32 beats NSVT today and <1 min of Afib.  --CTA C/A/P done, no aortic aneurysm, tortuous descending aorta  --MRCP showed Pancreas divisum. No pancreatic mass  --Echo w/NL LVS fx  NL RV fx   --S/P CATH luminal disease   -- started on Eliquis   -- GI input appreciated no objection to a/c  --D/C planning   --F/U Dr Vazquez with in 1-2 weeks SUBJECTIVE:  No CP, SOB    MEDICATIONS  (STANDING):  sodium chloride 0.9% lock flush 3 milliLiter(s) IV Push every 8 hours  lisinopril 40 milliGRAM(s) Oral daily  atorvastatin 80 milliGRAM(s) Oral at bedtime  metoprolol succinate ER 50 milliGRAM(s) Oral daily  amLODIPine   Tablet 5 milliGRAM(s) Oral daily  multivitamin 1 Tablet(s) Oral daily  sodium chloride 0.9%. 500 milliLiter(s) (75 mL/Hr) IV Continuous <Continuous>  apixaban 5 milliGRAM(s) Oral every 12 hours    MEDICATIONS  (PRN):      LABS:                        11.6   3.75  )-----------( 127      ( 18 Aug 2017 05:40 )             35.7     Hemoglobin: 11.6 g/dL (08-18 @ 05:40)  Hemoglobin: 11.9 g/dL (08-17 @ 05:20)  Hemoglobin: 11.5 g/dL (08-16 @ 06:30)  Hemoglobin: 11.1 g/dL (08-15 @ 06:00)  Hemoglobin: 11.8 g/dL (08-14 @ 04:00)    08-18    140  |  103  |  24<H>  ----------------------------<  103<H>  4.4   |  26  |  1.09    Ca    9.5      18 Aug 2017 05:40  Mg     2.1     08-18      Creatinine Trend: 1.09<--, 1.18<--, 0.98<--, 1.07<--, 1.08<--, 1.07<--   PT/INR - ( 18 Aug 2017 05:40 )   PT: 12.4 SEC;   INR: 1.10          PTT - ( 18 Aug 2017 05:40 )  PTT:28.5 SEC        PHYSICAL EXAM  Vital Signs Last 24 Hrs  T(C): 36.8 (18 Aug 2017 13:35), Max: 36.9 (17 Aug 2017 21:25)  T(F): 98.3 (18 Aug 2017 13:35), Max: 98.4 (17 Aug 2017 21:25)  HR: 73 (18 Aug 2017 13:35) (57 - 73)  BP: 120/69 (18 Aug 2017 13:35) (94/51 - 120/69)  BP(mean): --  RR: 18 (18 Aug 2017 13:35) (18 - 18)  SpO2: 99% (18 Aug 2017 13:35) (99% - 99%)          Cardiovascular: Normal S1S2,RRR No JVD, 1/6 MARK, Peripheral pulses palpable 2+ B/L  Respiratory: Lungs clear to auscultation, normal effort  Gastrointestinal: Abdomen soft, ND, NT, +BS  Extremities no clubbing, cyanosis or edema b/l le's       DIAGNOSTIC DATA  TELEMETRY: SB/SR    < from: US Abdominal Aorta (08.11.17 @ 11:00) >  IMPRESSION:     No evidence of abdominal aortic aneurysm. Correlated with pending CT.      ADILIA ALATORRE M.D., RADIOLOGYRESIDENT  This document has been electronically signed.  CHINTAN JARVIS M.D., ATTENDING RADIOLOGIST  This document has been electronically signed. Aug 11 2017 12:27PM    < end of copied text >    < from: CT Angio Abdomen and Pelvis w/ IV Cont (08.11.17 @ 15:34) >  IMPRESSION:   No evidence of aortic dissection.  No evidence of AAA. Tortuous descending abdominal aorta.  Dilatation of the pancreatic and common bile duct. Further evaluation   with endoscopy and/or MRCP is suggested.    BESS BERTRAND M.D., ATTENDING RADIOLOGIST  This document has been electronically signed. Aug 11 2017  4:40PM    < end of copied text >    < from: MRI Pancreas w/wo Cont (08.15.17 @ 21:08) >  IMPRESSION:   Pancreas divisum. No pancreatic mass.    YURY CARRILLO M.D., RADIOLOGY RESIDENT  This document has been electronically signed.  ALTON BLISS M.D., ATTENDING RADIOLOGIST  This document has been electronically signed. Aug 16 2017 12:03PM    < end of copied text >    ECHO   1. Mitral annular calcification, otherwise normal mitral  valve. Minimal mitral regurgitation.  2. Endocardium not well visualized; grossly normal left  ventricular systolic function.  Endocardial visualization  enhanced with intravenous injection of echo contrast  (Definity).  3. The right ventricle is not well visualized; grossly  normal right ventricular systolic function.      ASSESSMENT AND PLAN:  84 year old female with PMH WPW ablation at [place] 2015, NIC, EF 47% in April 2017, Last NST 5/2017 with No ischemia, who has been having Dyspnea, and was placed on an event monitor through my office.  THe monitor recorded 32 beats NSVT today and <1 min of Afib.  --CTA C/A/P done, no aortic aneurysm, tortuous descending aorta  --MRCP showed Pancreas divisum. No pancreatic mass  --Echo w/NL LVS fx  NL RV fx   --S/P CATH luminal disease   -- started on Eliquis   -- GI input appreciated no objection to a/c  --D/C planning   --F/U Dr Vazquez 9/1/17 12 30-pm

## 2017-08-18 NOTE — PROGRESS NOTE ADULT - PROBLEM SELECTOR PROBLEM 2
Bradycardia
Bradycardia
PAF (paroxysmal atrial fibrillation)
PAF (paroxysmal atrial fibrillation)
Bradycardia

## 2017-08-18 NOTE — PROGRESS NOTE ADULT - PROBLEM SELECTOR PROBLEM 3
R/O CAD (coronary artery disease)
R/O CAD (coronary artery disease)
PAF (paroxysmal atrial fibrillation)

## 2017-08-29 PROBLEM — E78.00 PURE HYPERCHOLESTEROLEMIA, UNSPECIFIED: Chronic | Status: ACTIVE | Noted: 2017-08-10

## 2017-08-29 PROBLEM — I10 ESSENTIAL (PRIMARY) HYPERTENSION: Chronic | Status: ACTIVE | Noted: 2017-08-10

## 2017-09-19 PROBLEM — Z00.00 ENCOUNTER FOR PREVENTIVE HEALTH EXAMINATION: Status: ACTIVE | Noted: 2017-09-19

## 2017-10-05 ENCOUNTER — APPOINTMENT (OUTPATIENT)
Dept: GASTROENTEROLOGY | Facility: HOSPITAL | Age: 82
End: 2017-10-05

## 2018-07-24 NOTE — DISCHARGE NOTE ADULT - CAREGIVER NAME
Patient refusing medication now  She is saying she will take her medication when she goes to bed  Patient has poor insight  Labile and irritable at times   Will continue to monitor Vicky

## 2022-02-18 NOTE — ED PROVIDER NOTE - PHYSICAL EXAMINATION
General: Denies fever, chills, weight loss  HEENT: Denies sore throat, dysphagia, vision changes  Cardio: Denies CP, palpitations  Pulm: (+) ROMERO  GI: Denies nausea, vomiting, abdominal pain, dark stools  Neuro: Denies focal weakness, headaches, decreased/altered sensation  Musc Skel: Denies back pain, joint pain  Skin: Denies recent rashes  Endocrine: Denies intolerance to heat or coldness  Psych: Denies anxiety and depressed mood Poor (<50%)

## 2023-02-22 NOTE — ED PROVIDER NOTE - MUSCULOSKELETAL NEGATIVE STATEMENT, MLM
We are committed to providing you with the best care possible. In order to help us achieve these goals please remember to bring all medications, herbal products, and over the counter supplements with you to each visit. If your provider has ordered testing for you, please be sure to follow up with our office if you have not received results within 7 days after the testing took place. *If you receive a survey after visiting one of our offices, please take time to share your experience concerning your physician office visit. These surveys are confidential and no health information about you is shared. We are eager to improve for you and we are counting on your feedback to help make that happen. no back pain, no gout, no musculoskeletal pain, no neck pain, and no weakness.